# Patient Record
Sex: FEMALE | Race: WHITE | Employment: FULL TIME | URBAN - METROPOLITAN AREA
[De-identification: names, ages, dates, MRNs, and addresses within clinical notes are randomized per-mention and may not be internally consistent; named-entity substitution may affect disease eponyms.]

---

## 2017-03-15 ENCOUNTER — HOSPITAL ENCOUNTER (OUTPATIENT)
Dept: MAMMOGRAPHY | Age: 47
Discharge: HOME | End: 2017-03-15
Attending: OBSTETRICS & GYNECOLOGY
Payer: COMMERCIAL

## 2017-03-15 DIAGNOSIS — Z12.31 VISIT FOR SCREENING MAMMOGRAM: ICD-10-CM

## 2017-03-15 PROCEDURE — 77067 SCR MAMMO BI INCL CAD: CPT

## 2017-03-23 ENCOUNTER — TELEPHONE (OUTPATIENT)
Dept: INTERNAL MEDICINE CLINIC | Facility: CLINIC | Age: 47
End: 2017-03-23

## 2017-03-23 DIAGNOSIS — Z13.0 SCREENING FOR DISORDER OF BLOOD AND BLOOD-FORMING ORGANS: ICD-10-CM

## 2017-03-23 DIAGNOSIS — Z00.00 LABORATORY EXAM ORDERED AS PART OF ROUTINE GENERAL MEDICAL EXAMINATION: Primary | ICD-10-CM

## 2017-03-23 DIAGNOSIS — Z13.220 SCREENING FOR LIPOID DISORDERS: ICD-10-CM

## 2017-03-23 DIAGNOSIS — Z13.228 SCREENING FOR METABOLIC DISORDER: ICD-10-CM

## 2017-04-20 ENCOUNTER — LAB ENCOUNTER (OUTPATIENT)
Dept: LAB | Age: 47
End: 2017-04-20
Attending: INTERNAL MEDICINE
Payer: COMMERCIAL

## 2017-04-20 DIAGNOSIS — Z00.00 LABORATORY EXAM ORDERED AS PART OF ROUTINE GENERAL MEDICAL EXAMINATION: ICD-10-CM

## 2017-04-20 DIAGNOSIS — Z13.220 SCREENING FOR LIPOID DISORDERS: ICD-10-CM

## 2017-04-20 DIAGNOSIS — Z13.0 SCREENING FOR DISORDER OF BLOOD AND BLOOD-FORMING ORGANS: ICD-10-CM

## 2017-04-20 DIAGNOSIS — Z13.228 SCREENING FOR METABOLIC DISORDER: ICD-10-CM

## 2017-04-20 PROCEDURE — 80061 LIPID PANEL: CPT | Performed by: INTERNAL MEDICINE

## 2017-04-20 PROCEDURE — 36415 COLL VENOUS BLD VENIPUNCTURE: CPT | Performed by: INTERNAL MEDICINE

## 2017-04-20 PROCEDURE — 85025 COMPLETE CBC W/AUTO DIFF WBC: CPT | Performed by: INTERNAL MEDICINE

## 2017-04-20 PROCEDURE — 80053 COMPREHEN METABOLIC PANEL: CPT | Performed by: INTERNAL MEDICINE

## 2017-05-10 ENCOUNTER — OFFICE VISIT (OUTPATIENT)
Dept: INTERNAL MEDICINE CLINIC | Facility: CLINIC | Age: 47
End: 2017-05-10

## 2017-05-10 VITALS
HEIGHT: 63 IN | RESPIRATION RATE: 16 BRPM | DIASTOLIC BLOOD PRESSURE: 60 MMHG | WEIGHT: 124 LBS | BODY MASS INDEX: 21.97 KG/M2 | HEART RATE: 60 BPM | SYSTOLIC BLOOD PRESSURE: 102 MMHG | TEMPERATURE: 98 F

## 2017-05-10 DIAGNOSIS — Z00.00 ROUTINE GENERAL MEDICAL EXAMINATION AT A HEALTH CARE FACILITY: Primary | ICD-10-CM

## 2017-05-10 DIAGNOSIS — L98.9 SKIN LESION: ICD-10-CM

## 2017-05-10 PROCEDURE — 99396 PREV VISIT EST AGE 40-64: CPT | Performed by: INTERNAL MEDICINE

## 2017-05-10 NOTE — PROGRESS NOTES
HPI:    Patient ID: Wily Silva is a 55year old female.     HPI  Here for pe, up to date with gyne, no complaints, recent labs done and ok, exercises and feels well  /60 mmHg  Pulse 60  Temp(Src) 98.2 °F (36.8 °C) (Oral)  Resp 16  Ht 63\" to person, place, and time. Skin: Skin is warm. She is not diaphoretic. Keratotic skin lesion, non pigmented behind right ear 1mm   Psychiatric: She has a normal mood and affect.               ASSESSMENT/PLAN:   Routine general medical examination at a

## 2017-12-09 PROBLEM — M72.2 PLANTAR FASCIITIS OF LEFT FOOT: Status: ACTIVE | Noted: 2017-12-09

## 2017-12-09 PROBLEM — S76.319A HAMSTRING STRAIN, UNSPECIFIED LATERALITY, INITIAL ENCOUNTER: Status: ACTIVE | Noted: 2017-12-09

## 2018-03-23 ENCOUNTER — LAB ENCOUNTER (OUTPATIENT)
Dept: LAB | Age: 48
End: 2018-03-23
Attending: NURSE PRACTITIONER
Payer: COMMERCIAL

## 2018-03-23 DIAGNOSIS — Z01.419 PAP SMEAR, LOW-RISK: Primary | ICD-10-CM

## 2018-03-23 PROCEDURE — 88175 CYTOPATH C/V AUTO FLUID REDO: CPT

## 2018-03-23 PROCEDURE — 87624 HPV HI-RISK TYP POOLED RSLT: CPT

## 2018-03-26 LAB — HPV I/H RISK 1 DNA SPEC QL NAA+PROBE: NEGATIVE

## 2018-03-28 ENCOUNTER — HOSPITAL ENCOUNTER (OUTPATIENT)
Dept: MAMMOGRAPHY | Age: 48
Discharge: HOME | End: 2018-03-28
Attending: NURSE PRACTITIONER
Payer: COMMERCIAL

## 2018-03-28 DIAGNOSIS — Z12.31 VISIT FOR SCREENING MAMMOGRAM: ICD-10-CM

## 2018-03-28 PROCEDURE — 77067 SCR MAMMO BI INCL CAD: CPT | Performed by: NURSE PRACTITIONER

## 2018-09-12 ENCOUNTER — OFFICE VISIT (OUTPATIENT)
Dept: INTERNAL MEDICINE CLINIC | Facility: CLINIC | Age: 48
End: 2018-09-12
Payer: COMMERCIAL

## 2018-09-12 VITALS
WEIGHT: 128 LBS | HEART RATE: 66 BPM | SYSTOLIC BLOOD PRESSURE: 124 MMHG | HEIGHT: 63 IN | TEMPERATURE: 98 F | BODY MASS INDEX: 22.68 KG/M2 | DIASTOLIC BLOOD PRESSURE: 70 MMHG | RESPIRATION RATE: 14 BRPM

## 2018-09-12 DIAGNOSIS — M62.830 LUMBAR PARASPINAL MUSCLE SPASM: ICD-10-CM

## 2018-09-12 DIAGNOSIS — M54.16 LUMBAR RADICULOPATHY: ICD-10-CM

## 2018-09-12 DIAGNOSIS — M62.838 CERVICAL PARASPINAL MUSCLE SPASM: Primary | ICD-10-CM

## 2018-09-12 PROCEDURE — 99213 OFFICE O/P EST LOW 20 MIN: CPT | Performed by: INTERNAL MEDICINE

## 2018-09-12 RX ORDER — OMEGA-3 FATTY ACIDS/FISH OIL 300-1000MG
CAPSULE ORAL
COMMUNITY
End: 2020-01-09 | Stop reason: ALTCHOICE

## 2018-09-12 RX ORDER — CYCLOBENZAPRINE HCL 10 MG
10 TABLET ORAL 2 TIMES DAILY PRN
Qty: 28 TABLET | Refills: 0 | Status: SHIPPED | OUTPATIENT
Start: 2018-09-12 | End: 2018-09-26

## 2018-09-12 NOTE — PROGRESS NOTES
Gino Orozco  10/3/1970    Patient presents with:  Neck Pain: AB RM 12, Patient states feeling tingling both knee down, neck injury       SUBJECTIVE   Gino Orozco is a 52year old female who presents with neck and lower back discomfort wit also worsen following exertional physical activity or with certain positions. She denies any recent trauma affecting her lower back. Review of Systems   No f/c/chest pain or sob. No cough. No abd pain/n/v/d. No ha or dizziness.  No other complaints toda Smoking status: Never Smoker      Smokeless tobacco: Never Used    Alcohol use:  Yes      Alcohol/week: 2.0 - 2.5 oz      Types: 4 - 5 Glasses of wine per week      Comment: Cage done 09/12/2018    Drug use: No        OBJECTIVE:   /70   Pulse 66   Tem issues and agrees to the plan. TODAY'S ORDERS     No orders of the defined types were placed in this encounter.       Meds & Refills:  Requested Prescriptions     Signed Prescriptions Disp Refills   • Cyclobenzaprine HCl 10 MG Oral Tab 28 tablet 0     Si

## 2018-09-21 ENCOUNTER — TELEPHONE (OUTPATIENT)
Dept: INTERNAL MEDICINE CLINIC | Facility: CLINIC | Age: 48
End: 2018-09-21

## 2018-09-21 DIAGNOSIS — Z13.228 SCREENING FOR METABOLIC DISORDER: ICD-10-CM

## 2018-09-21 DIAGNOSIS — Z13.0 SCREENING FOR DISORDER OF BLOOD AND BLOOD-FORMING ORGANS: ICD-10-CM

## 2018-09-21 DIAGNOSIS — Z13.220 SCREENING FOR LIPOID DISORDERS: ICD-10-CM

## 2018-09-21 DIAGNOSIS — Z00.00 ROUTINE GENERAL MEDICAL EXAMINATION AT A HEALTH CARE FACILITY: Primary | ICD-10-CM

## 2018-09-21 NOTE — TELEPHONE ENCOUNTER
Pt scheduled for a CPE on 12/10/17 with JL Orders to Eleanor Pacheco must fast no call back required

## 2018-09-24 PROBLEM — K51.20 ULCERATIVE PROCTITIS WITHOUT COMPLICATION (HCC): Status: ACTIVE | Noted: 2018-09-24

## 2018-09-28 ENCOUNTER — PATIENT MESSAGE (OUTPATIENT)
Dept: INTERNAL MEDICINE CLINIC | Facility: CLINIC | Age: 48
End: 2018-09-28

## 2018-10-01 RX ORDER — METHYLPREDNISOLONE 4 MG/1
TABLET ORAL
Qty: 1 KIT | Refills: 0 | Status: SHIPPED | OUTPATIENT
Start: 2018-10-01 | End: 2018-12-10

## 2018-10-01 NOTE — TELEPHONE ENCOUNTER
From: Sky Mederos  To: Deborah Nagy MD  Sent: 9/28/2018 9:22 PM CDT  Subject: Visit Follow-up Question    Hi Dr. Sav Khan been taking the muscle relaxer every night, but I still have the pain on the right side of my neck.  I am still experienc

## 2018-10-18 ENCOUNTER — PATIENT MESSAGE (OUTPATIENT)
Dept: INTERNAL MEDICINE CLINIC | Facility: CLINIC | Age: 48
End: 2018-10-18

## 2018-10-18 DIAGNOSIS — M54.12 CERVICAL RADICULOPATHY: Primary | ICD-10-CM

## 2018-10-19 NOTE — TELEPHONE ENCOUNTER
[id number]  ASSESSMENT AND PLAN:   Sky Mederos is a 52year old female who presents with neck and lower back discomfort with bilateral lower extremity pain and tingling.      Cervical and lumbar paraspinal muscle strain with intermittent radicu

## 2018-10-19 NOTE — TELEPHONE ENCOUNTER
From: Sherryle Sergeant  To: Houston Zhu MD  Sent: 10/18/2018 10:27 PM CDT  Subject: Visit Daniele Ramos,  I finished the Medrol Dosepak, but I'm still having pain on the right side of my neck.  The pain keeps waking me up at night

## 2018-10-26 ENCOUNTER — HOSPITAL ENCOUNTER (OUTPATIENT)
Dept: GENERAL RADIOLOGY | Age: 48
Discharge: HOME | End: 2018-10-26
Attending: INTERNAL MEDICINE
Payer: COMMERCIAL

## 2018-10-26 DIAGNOSIS — M54.12 CERVICAL RADICULOPATHY: ICD-10-CM

## 2018-10-26 PROCEDURE — 72050 X-RAY EXAM NECK SPINE 4/5VWS: CPT | Performed by: INTERNAL MEDICINE

## 2018-12-05 ENCOUNTER — LAB ENCOUNTER (OUTPATIENT)
Dept: LAB | Age: 48
End: 2018-12-05
Attending: INTERNAL MEDICINE
Payer: COMMERCIAL

## 2018-12-05 DIAGNOSIS — Z13.0 SCREENING FOR DISORDER OF BLOOD AND BLOOD-FORMING ORGANS: ICD-10-CM

## 2018-12-05 DIAGNOSIS — Z13.228 SCREENING FOR METABOLIC DISORDER: ICD-10-CM

## 2018-12-05 DIAGNOSIS — Z13.220 SCREENING FOR LIPOID DISORDERS: ICD-10-CM

## 2018-12-05 DIAGNOSIS — Z00.00 ROUTINE GENERAL MEDICAL EXAMINATION AT A HEALTH CARE FACILITY: ICD-10-CM

## 2018-12-05 PROCEDURE — 80061 LIPID PANEL: CPT | Performed by: INTERNAL MEDICINE

## 2018-12-05 PROCEDURE — 85025 COMPLETE CBC W/AUTO DIFF WBC: CPT | Performed by: INTERNAL MEDICINE

## 2018-12-05 PROCEDURE — 80053 COMPREHEN METABOLIC PANEL: CPT | Performed by: INTERNAL MEDICINE

## 2018-12-10 ENCOUNTER — APPOINTMENT (OUTPATIENT)
Dept: LAB | Age: 48
End: 2018-12-10
Attending: INTERNAL MEDICINE
Payer: COMMERCIAL

## 2018-12-10 ENCOUNTER — OFFICE VISIT (OUTPATIENT)
Dept: INTERNAL MEDICINE CLINIC | Facility: CLINIC | Age: 48
End: 2018-12-10
Payer: COMMERCIAL

## 2018-12-10 VITALS
HEIGHT: 63 IN | SYSTOLIC BLOOD PRESSURE: 104 MMHG | TEMPERATURE: 98 F | WEIGHT: 131 LBS | RESPIRATION RATE: 20 BRPM | DIASTOLIC BLOOD PRESSURE: 68 MMHG | BODY MASS INDEX: 23.21 KG/M2 | HEART RATE: 68 BPM

## 2018-12-10 DIAGNOSIS — M79.2 NEURALGIA: ICD-10-CM

## 2018-12-10 DIAGNOSIS — M54.2 NECK PAIN ON RIGHT SIDE: ICD-10-CM

## 2018-12-10 DIAGNOSIS — Z00.00 ROUTINE GENERAL MEDICAL EXAMINATION AT A HEALTH CARE FACILITY: Primary | ICD-10-CM

## 2018-12-10 PROCEDURE — 84443 ASSAY THYROID STIM HORMONE: CPT | Performed by: INTERNAL MEDICINE

## 2018-12-10 PROCEDURE — 99212 OFFICE O/P EST SF 10 MIN: CPT | Performed by: INTERNAL MEDICINE

## 2018-12-10 PROCEDURE — 84439 ASSAY OF FREE THYROXINE: CPT | Performed by: INTERNAL MEDICINE

## 2018-12-10 PROCEDURE — 99396 PREV VISIT EST AGE 40-64: CPT | Performed by: INTERNAL MEDICINE

## 2018-12-10 NOTE — PROGRESS NOTES
HPI:    Patient ID: Florecita Dougherty is a 50year old female.     HPI  Here for pe, feels well, follows with gyne, declines flu shot, right neck pain, better but not resolved after her oct visit, co chronic nightime bilateral hand parasthesias also with wheezes. Abdominal: Soft. There is no tenderness. Musculoskeletal: She exhibits no edema. Neurological: She is oriented to person, place, and time. No cranial nerve deficit. Skin: Skin is warm. No rash noted. She is not diaphoretic.    Psychiatric:

## 2019-01-14 ENCOUNTER — OFFICE VISIT (OUTPATIENT)
Dept: ELECTROPHYSIOLOGY | Facility: HOSPITAL | Age: 49
End: 2019-01-14
Attending: INTERNAL MEDICINE
Payer: COMMERCIAL

## 2019-01-14 DIAGNOSIS — M79.2 NEURALGIA: ICD-10-CM

## 2019-01-14 PROCEDURE — 95911 NRV CNDJ TEST 9-10 STUDIES: CPT | Performed by: OTHER

## 2019-01-14 PROCEDURE — 95886 MUSC TEST DONE W/N TEST COMP: CPT | Performed by: OTHER

## 2019-01-14 NOTE — PROCEDURES
[address] [place] [address]  Lenny, Hollie [facility]  [phone]            Patient: Jenny Partida Sex: Female  Patient ID: [id number] YOB: 1970      Visit Date: 1/14/2019 08:51  Patient Age On Visi Wrist ADM 2.40 ?3.60 12.8 ?5.0 100  100 ?50.00 Wrist - ADM 7        B. Elbow ADM 5.26  10.2  80 ?70 100  B. Elbow - Wrist 16 56 ?49      A. Elbow ADM 7.50  9.8  96.4 ?115 100  A. Elbow - B. Elbow 15 67 ?49   L Ulnar - ADM      Wrist ADM 2.45 ?3.60 9.2 ?5.0 1 The needle study of selected muscles of the RUE (C5-T1 myotomes), the LUE 9C5-T1 myotomes) and the bilateral C6-C7 paraspinal muscles was normal.     Conclusion: This is a normal study.  There is no electrodiagnostic evidence of an UE large fibre neuropat

## 2019-01-16 ENCOUNTER — TELEPHONE (OUTPATIENT)
Dept: INTERNAL MEDICINE CLINIC | Facility: CLINIC | Age: 49
End: 2019-01-16

## 2019-01-16 ENCOUNTER — HOSPITAL ENCOUNTER (OUTPATIENT)
Dept: PHYSICAL THERAPY | Facility: HOSPITAL | Age: 49
Setting detail: THERAPIES SERIES
Discharge: HOME | End: 2019-01-16
Attending: INTERNAL MEDICINE
Payer: COMMERCIAL

## 2019-01-16 PROCEDURE — 97161 PT EVAL LOW COMPLEX 20 MIN: CPT

## 2019-01-16 PROCEDURE — 97110 THERAPEUTIC EXERCISES: CPT

## 2019-01-16 NOTE — PROGRESS NOTES
SPINE EVALUATION:   Referring Physician: Dr. Elijah Falcon  Diagnosis: neck pain     Date of Service: 1/16/2019     PATIENT Shagufta Cagle is a 50year old female who presents to therapy today with complaints of R sided neck pain following a but does have some deep neck flexor and scapular weakness that is contributing to her pain and difficulty with daily activities, including turning her head, lifting, sleeping, and regular exercise.   Nabila Elliott would benefit from skilled Physical Therapy to rosalba order to improve functional mobility. Frequency / Duration: Patient will be seen for 2 x/week or a total of 8 visits over a 90 day period. Treatment will include: Manual Therapy; Therapeutic Exercises; Neuromuscular Re-education;  Therapeutic Activity; P

## 2019-01-16 NOTE — TELEPHONE ENCOUNTER
Patient medical records faxed to [employer]. Faxed 1/16/2019 to [phone]    Faxed below items  Office notes for 9/12/2018 and 12/10/2018  Test results from 9/12/2019 and 12/10/2018  Medication list  Orders for EMG and PT.     Fax confirmatio

## 2019-01-21 ENCOUNTER — HOSPITAL ENCOUNTER (OUTPATIENT)
Dept: PHYSICAL THERAPY | Facility: HOSPITAL | Age: 49
Setting detail: THERAPIES SERIES
Discharge: HOME | End: 2019-01-21
Attending: INTERNAL MEDICINE
Payer: COMMERCIAL

## 2019-01-21 PROCEDURE — 97110 THERAPEUTIC EXERCISES: CPT

## 2019-01-21 PROCEDURE — 97140 MANUAL THERAPY 1/> REGIONS: CPT

## 2019-01-21 NOTE — PROGRESS NOTES
Dx: neck pain           Authorized # of Visits:  8         Next MD visit: none scheduled  Fall Risk: standard         Precautions: n/a             Subjective: Patient reports that her neck felt more stiff for about 2 days following initial evaluation and t Total Treatment Time: 35 min

## 2019-01-23 ENCOUNTER — HOSPITAL ENCOUNTER (OUTPATIENT)
Dept: PHYSICAL THERAPY | Facility: HOSPITAL | Age: 49
Setting detail: THERAPIES SERIES
Discharge: HOME | End: 2019-01-23
Attending: INTERNAL MEDICINE
Payer: COMMERCIAL

## 2019-01-23 PROCEDURE — 97140 MANUAL THERAPY 1/> REGIONS: CPT

## 2019-01-23 PROCEDURE — 97110 THERAPEUTIC EXERCISES: CPT

## 2019-01-23 NOTE — PROGRESS NOTES
Dx: neck pain           Authorized # of Visits:  8         Next MD visit: none scheduled  Fall Risk: standard         Precautions: n/a             Subjective: Patient reports that her neck felt stiff again after previous session but that she was able to do extension isometric, [id number]        Seated dips Cervical rotation isometric, 10x5 sec                                       Charges: Paramjit 1( 10 min) manual x 2 ( 25 min)   Total Timed Treatment: 35 min     Total Treatment Time: 35 min

## 2019-01-28 ENCOUNTER — APPOINTMENT (OUTPATIENT)
Dept: PHYSICAL THERAPY | Facility: HOSPITAL | Age: 49
End: 2019-01-28
Attending: INTERNAL MEDICINE
Payer: COMMERCIAL

## 2019-01-30 ENCOUNTER — APPOINTMENT (OUTPATIENT)
Dept: PHYSICAL THERAPY | Facility: HOSPITAL | Age: 49
End: 2019-01-30
Attending: INTERNAL MEDICINE
Payer: COMMERCIAL

## 2019-02-04 ENCOUNTER — HOSPITAL ENCOUNTER (OUTPATIENT)
Dept: PHYSICAL THERAPY | Facility: HOSPITAL | Age: 49
Setting detail: THERAPIES SERIES
Discharge: HOME | End: 2019-02-04
Attending: INTERNAL MEDICINE
Payer: OTHER MISCELLANEOUS

## 2019-02-04 PROCEDURE — 97140 MANUAL THERAPY 1/> REGIONS: CPT

## 2019-02-04 PROCEDURE — 97110 THERAPEUTIC EXERCISES: CPT

## 2019-02-04 NOTE — PROGRESS NOTES
Dx: neck pain           Authorized # of Visits:  8         Next MD visit: none scheduled  Fall Risk: standard         Precautions: n/a             Subjective: Patient reports that she was feeling very good until yesterday.  After doing pushups and planks wh Cervical paraspinal STM, 5 min       Seated median nerve glide, 20 Seated median nerve glide, 20 Seated median nerve glide, 20       Seated cervical mobilization w/ movement, L rotation at C3-4, 20 Cervical extension isometric, [id number] Seated cervical rota

## 2019-02-06 ENCOUNTER — APPOINTMENT (OUTPATIENT)
Dept: PHYSICAL THERAPY | Facility: HOSPITAL | Age: 49
End: 2019-02-06
Attending: INTERNAL MEDICINE
Payer: OTHER MISCELLANEOUS

## 2019-02-11 ENCOUNTER — HOSPITAL ENCOUNTER (OUTPATIENT)
Dept: PHYSICAL THERAPY | Facility: HOSPITAL | Age: 49
Setting detail: THERAPIES SERIES
Discharge: HOME | End: 2019-02-11
Attending: INTERNAL MEDICINE
Payer: OTHER MISCELLANEOUS

## 2019-02-11 PROCEDURE — 97110 THERAPEUTIC EXERCISES: CPT

## 2019-02-11 PROCEDURE — 97140 MANUAL THERAPY 1/> REGIONS: CPT

## 2019-02-11 NOTE — PROGRESS NOTES
Dx: neck pain           Authorized # of Visits:  8         Next MD visit: none scheduled  Fall Risk: standard         Precautions: n/a             Subjective: Patient felt good until Thursday when she went back to \"plyo\" class.  She did not have pain duri STM, 5 min Cervical paraspinal STM, 5 min downslope mobilizlations, 5 min      Seated median nerve glide, 20 Seated median nerve glide, 20 Seated median nerve glide, 20 Seated extension w/ pillowcase, 15      Seated cervical mobilization w/ movement, L rot

## 2019-02-13 ENCOUNTER — HOSPITAL ENCOUNTER (OUTPATIENT)
Dept: PHYSICAL THERAPY | Facility: HOSPITAL | Age: 49
Setting detail: THERAPIES SERIES
Discharge: HOME | End: 2019-02-13
Attending: INTERNAL MEDICINE
Payer: OTHER MISCELLANEOUS

## 2019-02-13 PROCEDURE — 97110 THERAPEUTIC EXERCISES: CPT

## 2019-02-13 PROCEDURE — 97140 MANUAL THERAPY 1/> REGIONS: CPT

## 2019-02-13 NOTE — PROGRESS NOTES
Dx: neck pain           Authorized # of Visits:  8         Next MD visit: none scheduled  Fall Risk: standard         Precautions: n/a             Subjective: Patient reports her neck is feeling better.  She has noticed that her neck has felt less tight and 5 min Upslope mobilization C3-4, 5 min Supine lateral glide L>R, 5 min Supine lateral glide R>L, 5 min     Supine median nerve glide, 5 min Cervical paraspinal STM, 5 min Cervical paraspinal STM, 5 min downslope mobilizlations, 5 min downslope mobilizlatio

## 2019-02-20 ENCOUNTER — HOSPITAL ENCOUNTER (OUTPATIENT)
Dept: PHYSICAL THERAPY | Facility: HOSPITAL | Age: 49
Setting detail: THERAPIES SERIES
Discharge: HOME | End: 2019-02-20
Attending: INTERNAL MEDICINE
Payer: OTHER MISCELLANEOUS

## 2019-02-20 PROCEDURE — 97140 MANUAL THERAPY 1/> REGIONS: CPT

## 2019-02-20 PROCEDURE — 97110 THERAPEUTIC EXERCISES: CPT

## 2019-02-20 NOTE — PROGRESS NOTES
Dx: neck pain           Authorized # of Visits:  8         Next MD visit: none scheduled  Fall Risk: standard         Precautions: n/a             Subjective: Patient felt really good after previous session and she was able to go to exercise class over the Supine PPIVM L rotation at C3-4, 5 min Upslope mobilization C3-4, 5 min Upslope mobilization C3-4, 5 min Supine lateral glide L>R, 5 min Supine lateral glide R>L, 5 min supine isometric cervical rotation, [id number] each    Supine median nerve glide, 5 min

## 2019-02-27 ENCOUNTER — HOSPITAL ENCOUNTER (OUTPATIENT)
Dept: PHYSICAL THERAPY | Facility: HOSPITAL | Age: 49
Setting detail: THERAPIES SERIES
Discharge: HOME | End: 2019-02-27
Attending: INTERNAL MEDICINE
Payer: OTHER MISCELLANEOUS

## 2019-02-27 PROCEDURE — 97140 MANUAL THERAPY 1/> REGIONS: CPT

## 2019-02-27 PROCEDURE — 97110 THERAPEUTIC EXERCISES: CPT

## 2019-02-27 NOTE — PROGRESS NOTES
Dx: neck pain           Authorized # of Visits:  8         Next MD visit: none scheduled  Fall Risk: standard         Precautions: n/a             Subjective: Patient has not had pain during the day since previous session and has been able to go to exercis III+ in extension, 5 min Prone mid trap, 4#, 2x10   Same with slight R rotation, 5 min Cervical lateral glide, C3-4 R>L, gr III Prone C4-6 R UPA, gr III+, 5 min Prone C4-6 R UPA, gr III+, 5 min Prone C4-6 R UPA, gr IV, 10 min S/l C3-4 R UPA, gr IV+ Prone l

## 2019-03-06 ENCOUNTER — HOSPITAL ENCOUNTER (OUTPATIENT)
Dept: PHYSICAL THERAPY | Facility: HOSPITAL | Age: 49
Setting detail: THERAPIES SERIES
Discharge: HOME | End: 2019-03-06
Attending: INTERNAL MEDICINE
Payer: OTHER MISCELLANEOUS

## 2019-03-06 PROCEDURE — 97110 THERAPEUTIC EXERCISES: CPT

## 2019-03-06 PROCEDURE — 97140 MANUAL THERAPY 1/> REGIONS: CPT

## 2019-03-06 NOTE — PROGRESS NOTES
Dx: neck pain           Authorized # of Visits:  8         Next MD visit: none scheduled  Fall Risk: standard         Precautions: n/a             Subjective: Patient reports that her neck still feels overall better but she does still have stiffness upon w C4-6 R UPA, gr IV, 10 min S/l C3-4 R UPA, gr IV+ Prone low trap, 3#, 2x10 Supine side glide C3-4 L>R, 5 min      Supine PPIVM L rotation at C3-4, 5 min Upslope mobilization C3-4, 5 min Upslope mobilization C3-4, 5 min Supine lateral glide L>R, 5 min Supine

## 2019-03-20 ENCOUNTER — HOSPITAL ENCOUNTER (OUTPATIENT)
Dept: PHYSICAL THERAPY | Facility: HOSPITAL | Age: 49
Setting detail: THERAPIES SERIES
Discharge: HOME | End: 2019-03-20
Attending: INTERNAL MEDICINE
Payer: OTHER MISCELLANEOUS

## 2019-03-20 PROCEDURE — 97110 THERAPEUTIC EXERCISES: CPT

## 2019-03-20 PROCEDURE — 97140 MANUAL THERAPY 1/> REGIONS: CPT

## 2019-03-20 NOTE — PROGRESS NOTES
Dx: neck pain           Authorized # of Visits:  8         Next MD visit: none scheduled  Fall Risk: standard         Precautions: n/a             Subjective: Patient reports that her neck has felt better while away on vacation.  She slept in hotel rooms an slight R rotation, 5 min Cervical lateral glide, C3-4 R>L, gr III Prone C4-6 R UPA, gr III+, 5 min Prone C4-6 R UPA, gr III+, 5 min Prone C4-6 R UPA, gr IV, 10 min S/l C3-4 R UPA, gr IV+ Prone low trap, 3#, 2x10 Supine side glide C3-4 L>R, 5 min Supine evette

## 2019-03-26 PROBLEM — Z92.89 HISTORY OF ENDOMETRIAL BIOPSY: Status: ACTIVE | Noted: 2019-03-26

## 2019-03-26 PROBLEM — Z30.430 ENCOUNTER FOR IUD INSERTION: Status: ACTIVE | Noted: 2019-03-26

## 2019-04-01 ENCOUNTER — HOSPITAL ENCOUNTER (OUTPATIENT)
Dept: PHYSICAL THERAPY | Facility: HOSPITAL | Age: 49
Setting detail: THERAPIES SERIES
Discharge: HOME | End: 2019-04-01
Attending: INTERNAL MEDICINE
Payer: OTHER MISCELLANEOUS

## 2019-04-01 PROCEDURE — 97140 MANUAL THERAPY 1/> REGIONS: CPT

## 2019-04-01 PROCEDURE — 97110 THERAPEUTIC EXERCISES: CPT

## 2019-04-01 NOTE — PROGRESS NOTES
Dx: neck pain           Authorized # of Visits:  8         Next MD visit: none scheduled  Fall Risk: standard         Precautions: n/a             Subjective: Patient reports that she has gone back to exercising at 100%.  She has been doing handstands and h slight R rotation, 5 min Cervical lateral glide, C3-4 R>L, gr III Prone C4-6 R UPA, gr III+, 5 min Prone C4-6 R UPA, gr III+, 5 min Prone C4-6 R UPA, gr IV, 10 min S/l C3-4 R UPA, gr IV+ Prone low trap, 3#, 2x10 Supine side glide C3-4 L>R, 5 min Supine evette Prone self mobilization in R rotation                                  Charges: Paramjit 1( 15 min) manual x 2 ( 25 min)   Total Timed Treatment: 40 min     Total Treatment Time: 40 min

## 2019-04-03 ENCOUNTER — HOSPITAL ENCOUNTER (OUTPATIENT)
Dept: MAMMOGRAPHY | Age: 49
Discharge: HOME | End: 2019-04-03
Attending: NURSE PRACTITIONER
Payer: COMMERCIAL

## 2019-04-03 ENCOUNTER — APPOINTMENT (OUTPATIENT)
Dept: PHYSICAL THERAPY | Facility: HOSPITAL | Age: 49
End: 2019-04-03
Attending: INTERNAL MEDICINE
Payer: OTHER MISCELLANEOUS

## 2019-04-03 DIAGNOSIS — Z12.31 BREAST CANCER SCREENING BY MAMMOGRAM: ICD-10-CM

## 2019-04-03 PROCEDURE — 77063 BREAST TOMOSYNTHESIS BI: CPT | Performed by: NURSE PRACTITIONER

## 2019-04-03 PROCEDURE — 77067 SCR MAMMO BI INCL CAD: CPT | Performed by: NURSE PRACTITIONER

## 2019-06-13 ENCOUNTER — HOSPITAL ENCOUNTER (OUTPATIENT)
Dept: GENERAL RADIOLOGY | Age: 49
Discharge: HOME | End: 2019-06-13
Attending: PODIATRIST
Payer: COMMERCIAL

## 2019-06-13 ENCOUNTER — OFFICE VISIT (OUTPATIENT)
Dept: PODIATRY CLINIC | Facility: CLINIC | Age: 49
End: 2019-06-13
Payer: COMMERCIAL

## 2019-06-13 VITALS — HEART RATE: 67 BPM | DIASTOLIC BLOOD PRESSURE: 74 MMHG | RESPIRATION RATE: 18 BRPM | SYSTOLIC BLOOD PRESSURE: 116 MMHG

## 2019-06-13 DIAGNOSIS — M20.5X2 ACQUIRED HALLUX LIMITUS OF BOTH FEET: ICD-10-CM

## 2019-06-13 DIAGNOSIS — M20.5X1 ACQUIRED HALLUX LIMITUS OF BOTH FEET: Primary | ICD-10-CM

## 2019-06-13 DIAGNOSIS — M77.50 CAPSULITIS OF METATARSOPHALANGEAL (MTP) JOINT: ICD-10-CM

## 2019-06-13 DIAGNOSIS — M20.5X1 ACQUIRED HALLUX LIMITUS OF BOTH FEET: ICD-10-CM

## 2019-06-13 DIAGNOSIS — M20.5X2 ACQUIRED HALLUX LIMITUS OF BOTH FEET: Primary | ICD-10-CM

## 2019-06-13 PROCEDURE — 99203 OFFICE O/P NEW LOW 30 MIN: CPT | Performed by: PODIATRIST

## 2019-06-13 PROCEDURE — 20600 DRAIN/INJ JOINT/BURSA W/O US: CPT | Performed by: PODIATRIST

## 2019-06-13 PROCEDURE — 73620 X-RAY EXAM OF FOOT: CPT | Performed by: PODIATRIST

## 2019-06-13 NOTE — PROGRESS NOTES
Per [facility] request was draw 1 syringe with 0.5 ml kenalog 40 mg and 0.5 ml Marcaine 0.5% for this patient left foot. Jacque John

## 2019-06-16 RX ORDER — TRIAMCINOLONE ACETONIDE 40 MG/ML
20 INJECTION, SUSPENSION INTRA-ARTICULAR; INTRAMUSCULAR ONCE
Status: DISCONTINUED | OUTPATIENT
Start: 2019-06-16 | End: 2020-05-22 | Stop reason: WASHOUT

## 2019-06-17 NOTE — PROGRESS NOTES
Heidi Roman is a 50year old female. Patient presents with:   Foot Pain: Left - on set about 3 weeks ago - has pain by the lateral aspect of the big toe rated as 6/10 on and off         HPI:   This very pleasant 55-year-old female patient presents Not on file      Number of children: Not on file      Years of education: Not on file      Highest education level: Not on file    Tobacco Use      Smoking status: Never Smoker      Smokeless tobacco: Never Used    Substance and Sexual Activity      Alcoho left than the right. ASSESSMENT AND PLAN:   Diagnoses and all orders for this visit:    Acquired hallux limitus of both feet  -     XR FOOT WEIGHTBEARING (2 VIEWS), LEFT   (CPT=73620);  Future  -     XR FOOT WEIGHTBEARING (2 VIEWS), RIGHT   (CPT=73620);

## 2019-07-16 ENCOUNTER — OFFICE VISIT (OUTPATIENT)
Dept: PODIATRY CLINIC | Facility: CLINIC | Age: 49
End: 2019-07-16
Payer: COMMERCIAL

## 2019-07-16 DIAGNOSIS — M77.50 CAPSULITIS OF METATARSOPHALANGEAL (MTP) JOINT: ICD-10-CM

## 2019-07-16 DIAGNOSIS — M20.5X2 ACQUIRED HALLUX LIMITUS OF BOTH FEET: Primary | ICD-10-CM

## 2019-07-16 DIAGNOSIS — M20.5X1 ACQUIRED HALLUX LIMITUS OF BOTH FEET: Primary | ICD-10-CM

## 2019-07-16 PROCEDURE — 99213 OFFICE O/P EST LOW 20 MIN: CPT | Performed by: PODIATRIST

## 2019-07-16 NOTE — PROGRESS NOTES
Ariadne Cannon is a 50year old female. Patient presents with: Foot Pain: Left f/u - had cortisone inj on 6/13/19 - patient states she still has some pain 4/10, today 0/10. denies any numbness, tingling and/or pain.         HPI:   Patient returns to education level: Not on file    Tobacco Use      Smoking status: Never Smoker      Smokeless tobacco: Never Used    Substance and Sexual Activity      Alcohol use: Yes        Comment: 4x/month, 1-2 drinks Cage done 09/12/2018      Drug use: No      Sexual contact us if she wishes to do surgery. The patient indicates understanding of these issues and agrees to the plan. Return if symptoms worsen or fail to improve.     True Lesch, DPM  7/16/2019

## 2019-10-07 ENCOUNTER — TELEPHONE (OUTPATIENT)
Dept: INTERNAL MEDICINE CLINIC | Facility: CLINIC | Age: 49
End: 2019-10-07

## 2019-10-07 DIAGNOSIS — Z13.29 SCREENING FOR ENDOCRINE, METABOLIC AND IMMUNITY DISORDER: ICD-10-CM

## 2019-10-07 DIAGNOSIS — Z00.00 ROUTINE GENERAL MEDICAL EXAMINATION AT A HEALTH CARE FACILITY: Primary | ICD-10-CM

## 2019-10-07 DIAGNOSIS — Z13.228 SCREENING FOR ENDOCRINE, METABOLIC AND IMMUNITY DISORDER: ICD-10-CM

## 2019-10-07 DIAGNOSIS — Z13.220 SCREENING FOR LIPID DISORDERS: ICD-10-CM

## 2019-10-07 DIAGNOSIS — Z13.29 SCREENING FOR THYROID DISORDER: ICD-10-CM

## 2019-10-07 DIAGNOSIS — Z13.0 SCREENING FOR DISORDER OF BLOOD AND BLOOD-FORMING ORGANS: ICD-10-CM

## 2019-10-07 DIAGNOSIS — Z13.0 SCREENING FOR ENDOCRINE, METABOLIC AND IMMUNITY DISORDER: ICD-10-CM

## 2019-10-07 NOTE — TELEPHONE ENCOUNTER
Future Appointments   Date Time Provider Ramsey Martin   12/16/2019 11:00 AM Yosvany Anthony MD [facility] [address] [facility]     Orders to edward- Pt aware to fast-no call back required

## 2019-12-09 ENCOUNTER — LAB ENCOUNTER (OUTPATIENT)
Dept: LAB | Age: 49
End: 2019-12-09
Attending: INTERNAL MEDICINE
Payer: COMMERCIAL

## 2019-12-09 DIAGNOSIS — Z13.29 SCREENING FOR ENDOCRINE, METABOLIC AND IMMUNITY DISORDER: ICD-10-CM

## 2019-12-09 DIAGNOSIS — Z13.0 SCREENING FOR DISORDER OF BLOOD AND BLOOD-FORMING ORGANS: ICD-10-CM

## 2019-12-09 DIAGNOSIS — Z13.220 SCREENING FOR LIPID DISORDERS: ICD-10-CM

## 2019-12-09 DIAGNOSIS — Z13.0 SCREENING FOR ENDOCRINE, METABOLIC AND IMMUNITY DISORDER: ICD-10-CM

## 2019-12-09 DIAGNOSIS — Z13.228 SCREENING FOR ENDOCRINE, METABOLIC AND IMMUNITY DISORDER: ICD-10-CM

## 2019-12-09 DIAGNOSIS — Z00.00 ROUTINE GENERAL MEDICAL EXAMINATION AT A HEALTH CARE FACILITY: ICD-10-CM

## 2019-12-09 PROCEDURE — 80061 LIPID PANEL: CPT | Performed by: INTERNAL MEDICINE

## 2019-12-09 PROCEDURE — 85025 COMPLETE CBC W/AUTO DIFF WBC: CPT | Performed by: INTERNAL MEDICINE

## 2019-12-09 PROCEDURE — 80053 COMPREHEN METABOLIC PANEL: CPT | Performed by: INTERNAL MEDICINE

## 2019-12-16 ENCOUNTER — OFFICE VISIT (OUTPATIENT)
Dept: INTERNAL MEDICINE CLINIC | Facility: CLINIC | Age: 49
End: 2019-12-16
Payer: COMMERCIAL

## 2019-12-16 VITALS
HEART RATE: 72 BPM | SYSTOLIC BLOOD PRESSURE: 110 MMHG | TEMPERATURE: 98 F | HEIGHT: 63 IN | BODY MASS INDEX: 22.5 KG/M2 | WEIGHT: 127 LBS | DIASTOLIC BLOOD PRESSURE: 66 MMHG | RESPIRATION RATE: 16 BRPM

## 2019-12-16 DIAGNOSIS — K51.20 ULCERATIVE PROCTITIS WITHOUT COMPLICATION (HCC): Primary | ICD-10-CM

## 2019-12-16 DIAGNOSIS — M21.619 BUNION: ICD-10-CM

## 2019-12-16 DIAGNOSIS — Z00.00 ROUTINE GENERAL MEDICAL EXAMINATION AT A HEALTH CARE FACILITY: ICD-10-CM

## 2019-12-16 PROCEDURE — 99396 PREV VISIT EST AGE 40-64: CPT | Performed by: INTERNAL MEDICINE

## 2019-12-19 NOTE — PROGRESS NOTES
HPI:    Patient ID: Glynn Daley is a 52year old female.     HPI  Here for pe, co bunion left foot, follows with gyne, no other acute complaints, feels well except foot pain  /66 (BP Location: Right arm, Patient Position: Sitting, Cuff Size: Abdominal: Soft. There is no tenderness. Musculoskeletal:         General: No edema. Neurological: She is oriented to person, place, and time. No cranial nerve deficit. Skin: Skin is warm. She is not diaphoretic.    Psychiatric: She has a normal moo

## 2019-12-30 ENCOUNTER — TELEPHONE (OUTPATIENT)
Dept: PODIATRY CLINIC | Facility: CLINIC | Age: 49
End: 2019-12-30

## 2019-12-31 NOTE — TELEPHONE ENCOUNTER
Spoke to pt and scheduled appt with Venkat for 01/09/20 at [facility] at 11:15AM. Pt verbalized understanding.

## 2020-01-09 ENCOUNTER — TELEPHONE (OUTPATIENT)
Dept: PODIATRY CLINIC | Facility: CLINIC | Age: 50
End: 2020-01-09

## 2020-01-09 ENCOUNTER — OFFICE VISIT (OUTPATIENT)
Dept: PODIATRY CLINIC | Facility: CLINIC | Age: 50
End: 2020-01-09
Payer: COMMERCIAL

## 2020-01-09 DIAGNOSIS — M77.50 CAPSULITIS OF METATARSOPHALANGEAL (MTP) JOINT: ICD-10-CM

## 2020-01-09 DIAGNOSIS — M20.5X1 ACQUIRED HALLUX LIMITUS OF BOTH FEET: Primary | ICD-10-CM

## 2020-01-09 DIAGNOSIS — M20.5X2 ACQUIRED HALLUX LIMITUS OF BOTH FEET: Primary | ICD-10-CM

## 2020-01-09 PROCEDURE — 99213 OFFICE O/P EST LOW 20 MIN: CPT | Performed by: PODIATRIST

## 2020-01-09 NOTE — PROGRESS NOTES
Benigno Hillman is a 52year old female. Patient presents with: Foot Pain: F/u left foot pain that she thinks is getting worse. Rec'd cortisone injection on 6/13/19. Pain started coming back around Thanksgiving.   Would like to discuss possible teresa tobacco: Never Used    Substance and Sexual Activity      Alcohol use: Yes        Comment: 4x/month, 1-2 drinks Cage done 09/12/2018      Drug use: No      Sexual activity: Yes        Partners: Male        Birth control/protection: I.U.D.         Comment: L restricted activity possible nonweightbearing was also reviewed.   The possible complications and risks associated with the surgery including but not limited to recurrence of the deformity, nonresolution of the problem, infection as well as hospitalization

## 2020-01-09 NOTE — TELEPHONE ENCOUNTER
Procedure: Cheilectomy first metatarsal left foot  CPT code: 94352  Length of Surgery: 1 hour  Any Instruments: Mini power, mini fluoroscopy  Call patient: ASAP  Anesthesia: MAC  Location: [facility]  Assistance: none  Pacemaker: No  Anticoagulants: No  Nickel A

## 2020-01-10 ENCOUNTER — TELEPHONE (OUTPATIENT)
Dept: PODIATRY CLINIC | Facility: CLINIC | Age: 50
End: 2020-01-10

## 2020-01-10 NOTE — TELEPHONE ENCOUNTER
Patient contacted this date and scheduled for surgery at [place] for 1-22-20 for Cheilectomy of the first metatarsal, left foot. Surgical packet mailed out this date.

## 2020-01-17 ENCOUNTER — TELEPHONE (OUTPATIENT)
Dept: ADMINISTRATIVE | Age: 50
End: 2020-01-17

## 2020-01-17 NOTE — TELEPHONE ENCOUNTER
Called [facility] this date to see if prior Willye Gabinoia is required for the surgery. Spoke with [facility]. No prior auth required. Call CXB#[phone].

## 2020-01-23 ENCOUNTER — TELEPHONE (OUTPATIENT)
Dept: PODIATRY CLINIC | Facility: CLINIC | Age: 50
End: 2020-01-23

## 2020-01-23 NOTE — TELEPHONE ENCOUNTER
Contacted patient for post op update. Verified name and . Patient had left foot surgery 2020. Patient states she has \"aching \" all over her foot.    Patient states the pain is like a \"throbbing sensation\"  Pain scale when lying down is 4/1

## 2020-01-24 NOTE — TELEPHONE ENCOUNTER
Dr. Ivon Ferreira,      Please sign off on form:  -Highlight the patient and hit \"Chart\" button. -In Chart Review, w/in the Encounter tab - click 1 time on the Telephone call encounter for 1/17/20.  Scroll down the telephone encounter.  -Click \"scan on\" blue

## 2020-02-01 ENCOUNTER — OFFICE VISIT (OUTPATIENT)
Dept: PODIATRY CLINIC | Facility: CLINIC | Age: 50
End: 2020-02-01
Payer: COMMERCIAL

## 2020-02-01 DIAGNOSIS — Z98.890 POST-OPERATIVE STATE: Primary | ICD-10-CM

## 2020-02-01 PROCEDURE — 99024 POSTOP FOLLOW-UP VISIT: CPT | Performed by: PODIATRIST

## 2020-02-03 NOTE — PROGRESS NOTES
Maria Elena Terrell is a 52year old female. Patient presents with:  Post-Op: sx 1/22/20, left foot. pt office wearing post op shoe. she says this is her first day out. there is pain/some throbbing when putting foot down, relieved by elevation/icing.  pt name: Not on file      Number of children: Not on file      Years of education: Not on file      Highest education level: Not on file    Tobacco Use      Smoking status: Never Smoker      Smokeless tobacco: Never Used    Substance and Sexual Activity

## 2020-02-07 ENCOUNTER — OFFICE VISIT (OUTPATIENT)
Dept: PODIATRY CLINIC | Facility: CLINIC | Age: 50
End: 2020-02-07
Payer: COMMERCIAL

## 2020-02-07 DIAGNOSIS — Z98.890 POST-OPERATIVE STATE: Primary | ICD-10-CM

## 2020-02-07 PROCEDURE — 99024 POSTOP FOLLOW-UP VISIT: CPT | Performed by: PODIATRIST

## 2020-02-10 NOTE — PROGRESS NOTES
Karon Chowdhury is a 52year old female. Patient presents with:  Post-Op: left foot sx 1/22/20 - 2nd POV - pain wakes up patient at night - denies taking any pain medication - pain scale 4/10.         HPI:   Patient returns to clinic now 2 weeks statu Yes        Comment: 4x/month, 1-2 drinks Cage done 09/12/2018      Drug use: No      Sexual activity: Yes        Partners: Male        Birth control/protection: I.U.D. Comment: Justine iud    Other Topics      Concerns:        Caffeine Concern:  Yes

## 2020-02-11 ENCOUNTER — MED REC SCAN ONLY (OUTPATIENT)
Dept: INTERNAL MEDICINE CLINIC | Facility: CLINIC | Age: 50
End: 2020-02-11

## 2020-02-14 ENCOUNTER — OFFICE VISIT (OUTPATIENT)
Dept: PODIATRY CLINIC | Facility: CLINIC | Age: 50
End: 2020-02-14
Payer: COMMERCIAL

## 2020-02-14 DIAGNOSIS — M20.5X2 ACQUIRED HALLUX LIMITUS OF BOTH FEET: ICD-10-CM

## 2020-02-14 DIAGNOSIS — M20.5X1 ACQUIRED HALLUX LIMITUS OF BOTH FEET: ICD-10-CM

## 2020-02-14 DIAGNOSIS — M77.50 CAPSULITIS OF METATARSOPHALANGEAL (MTP) JOINT: ICD-10-CM

## 2020-02-14 DIAGNOSIS — Z98.890 POST-OPERATIVE STATE: Primary | ICD-10-CM

## 2020-02-14 PROCEDURE — 99024 POSTOP FOLLOW-UP VISIT: CPT | Performed by: PODIATRIST

## 2020-02-14 NOTE — PROGRESS NOTES
Gayle Boyd is a 52year old female. Patient presents with:  Post-Op: s/p Cheilectomy of the first metatarsal, left foot -- Sx on 01/22/20. Rates pain 3/10. Denies any fever or calf pain.          HPI:   Patient is now 3 weeks status post surgery 4x/month, 1-2 drinks Cage done 09/12/2018      Drug use: No      Sexual activity: Yes        Partners: Male        Birth control/protection: I.U.D.         Comment: Justine iud    Other Topics      Concerns:        Caffeine Concern: Yes        Exercise: Yes

## 2020-02-19 ENCOUNTER — TELEPHONE (OUTPATIENT)
Dept: PODIATRY CLINIC | Facility: CLINIC | Age: 50
End: 2020-02-19

## 2020-02-19 ENCOUNTER — OFFICE VISIT (OUTPATIENT)
Dept: PODIATRY CLINIC | Facility: CLINIC | Age: 50
End: 2020-02-19
Payer: COMMERCIAL

## 2020-02-19 DIAGNOSIS — M77.50 CAPSULITIS OF METATARSOPHALANGEAL (MTP) JOINT: ICD-10-CM

## 2020-02-19 DIAGNOSIS — Z98.890 STATUS POST FOOT SURGERY: Primary | ICD-10-CM

## 2020-02-19 PROCEDURE — 99024 POSTOP FOLLOW-UP VISIT: CPT | Performed by: PODIATRIST

## 2020-02-19 NOTE — PROGRESS NOTES
Dakota West is a 52year old female. Patient presents with:  Post-Op: LOV 2/14/20. sx 1/22/20, cheilectomy of the first metatarsal, left foot. pt tried to go back to work yesterday.  after working one shift her foot was very swollen and painful/th Ovarian/Colon Ca      Social History    Socioeconomic History      Marital status:       Spouse name: Not on file      Number of children: Not on file      Years of education: Not on file      Highest education level: Not on file    Tobacco Use

## 2020-02-19 NOTE — TELEPHONE ENCOUNTER
Pt returned to work after surgery stated yesterday was first day back and she couldn't finish her shift out because her foot began to swell and throbbing feeling what should she do please advise

## 2020-02-19 NOTE — TELEPHONE ENCOUNTER
Spoke to pt and she states that yesterday was her first day returning back to work after surgery. 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

## 2020-02-19 NOTE — TELEPHONE ENCOUNTER
Spoke to pt and informed her of Venkat's message and instruction. Scheduled appt with Venkat for today in [facility] at 2:30pm. Address and directions discussed.

## 2020-02-21 ENCOUNTER — OFFICE VISIT (OUTPATIENT)
Dept: PHYSICAL THERAPY | Facility: HOSPITAL | Age: 50
End: 2020-02-21
Attending: PODIATRIST
Payer: COMMERCIAL

## 2020-02-21 DIAGNOSIS — M77.50 CAPSULITIS OF METATARSOPHALANGEAL (MTP) JOINT: ICD-10-CM

## 2020-02-21 DIAGNOSIS — Z98.890 STATUS POST FOOT SURGERY: ICD-10-CM

## 2020-02-21 PROCEDURE — 97161 PT EVAL LOW COMPLEX 20 MIN: CPT

## 2020-02-21 PROCEDURE — 97140 MANUAL THERAPY 1/> REGIONS: CPT

## 2020-02-21 PROCEDURE — 97110 THERAPEUTIC EXERCISES: CPT

## 2020-02-21 NOTE — PROGRESS NOTES
LOWER EXTREMITY EVALUATION:   Referring Physician: Dr. Ami Hsieh  Diagnosis: s/p foot surgery, capsulitis of L 1st MTP joint     Date of Service: 2/21/2020     PATIENT SUMMARY   Simona Lo is a 52year old female who presents to therapy today w impairments, limited foot and ankle strength, and limited passive accessory joint mobility. Functional deficits include but are not limited to walking, stairs, wearing heels, running.   Signs and symptoms are consistent with diagnosis of 1st MTP capsulitis a HEP for: ankle and foot mobility and strength    Charges: PT Eval Low Complexity, therex x 1, manual x 1      Total Timed Treatment: 30 min     Total Treatment Time: 45 min     PLAN OF CARE:    Goals: (to be met in 8 visits)  Patient will tolerate standi

## 2020-02-26 ENCOUNTER — APPOINTMENT (OUTPATIENT)
Dept: PHYSICAL THERAPY | Facility: HOSPITAL | Age: 50
End: 2020-02-26
Attending: PODIATRIST
Payer: COMMERCIAL

## 2020-03-04 ENCOUNTER — OFFICE VISIT (OUTPATIENT)
Dept: PHYSICAL THERAPY | Facility: HOSPITAL | Age: 50
End: 2020-03-04
Attending: PODIATRIST
Payer: COMMERCIAL

## 2020-03-04 PROCEDURE — 97110 THERAPEUTIC EXERCISES: CPT

## 2020-03-04 PROCEDURE — 97140 MANUAL THERAPY 1/> REGIONS: CPT

## 2020-03-04 NOTE — PROGRESS NOTES
Dx: s/p foot surgery, capsulitis of L 1st MTP joint             Authorized # of Visits:  8 per POC         Next MD visit: none scheduled  Fall Risk: standard         Precautions: n/a             Subjective: Patient reports that her foot is less swollen, sh gastroc stretch, 30 sec    Soleus stretch, 30 sec         1st MTP dorsal and plantar glide, 10 min         1st MTP PROM, 15 min         Towel curl, 2 min         Seated toe abduction, 1 min         Self 1st MTP ROM

## 2020-03-09 ENCOUNTER — OFFICE VISIT (OUTPATIENT)
Dept: PHYSICAL THERAPY | Facility: HOSPITAL | Age: 50
End: 2020-03-09
Attending: PODIATRIST
Payer: COMMERCIAL

## 2020-03-09 DIAGNOSIS — M77.50 CAPSULITIS OF METATARSOPHALANGEAL (MTP) JOINT: ICD-10-CM

## 2020-03-09 DIAGNOSIS — Z98.890 STATUS POST FOOT SURGERY: ICD-10-CM

## 2020-03-09 PROCEDURE — 97110 THERAPEUTIC EXERCISES: CPT

## 2020-03-09 PROCEDURE — 97140 MANUAL THERAPY 1/> REGIONS: CPT

## 2020-03-09 NOTE — PROGRESS NOTES
Dx: s/p foot surgery, capsulitis of L 1st MTP joint             Authorized # of Visits:  8 per POC         Next MD visit: none scheduled  Fall Risk: standard         Precautions: n/a             Subjective: Patient had some increase in soreness over the we MTP PROM, 15 min 1st MTP PROM, 15 min        Towel curl, 2 min Towel curl, 2 min        Seated toe abduction, 1 min Seated toe abduction, 1 min        Self 1st MTP ROM Yellow TB 1st toe extension, 2x10         Stair training, 3 min

## 2020-03-11 ENCOUNTER — OFFICE VISIT (OUTPATIENT)
Dept: PHYSICAL THERAPY | Facility: HOSPITAL | Age: 50
End: 2020-03-11
Attending: PODIATRIST
Payer: COMMERCIAL

## 2020-03-11 DIAGNOSIS — M77.50 CAPSULITIS OF METATARSOPHALANGEAL (MTP) JOINT: ICD-10-CM

## 2020-03-11 DIAGNOSIS — Z98.890 STATUS POST FOOT SURGERY: ICD-10-CM

## 2020-03-11 PROCEDURE — 97140 MANUAL THERAPY 1/> REGIONS: CPT

## 2020-03-11 PROCEDURE — 97110 THERAPEUTIC EXERCISES: CPT

## 2020-03-13 ENCOUNTER — OFFICE VISIT (OUTPATIENT)
Dept: PODIATRY CLINIC | Facility: CLINIC | Age: 50
End: 2020-03-13
Payer: COMMERCIAL

## 2020-03-13 DIAGNOSIS — M77.50 CAPSULITIS OF METATARSOPHALANGEAL (MTP) JOINT: ICD-10-CM

## 2020-03-13 DIAGNOSIS — M20.5X1 ACQUIRED HALLUX LIMITUS OF BOTH FEET: ICD-10-CM

## 2020-03-13 DIAGNOSIS — Z98.890 POST-OPERATIVE STATE: ICD-10-CM

## 2020-03-13 DIAGNOSIS — Z98.890 STATUS POST FOOT SURGERY: Primary | ICD-10-CM

## 2020-03-13 DIAGNOSIS — M20.5X2 ACQUIRED HALLUX LIMITUS OF BOTH FEET: ICD-10-CM

## 2020-03-13 PROCEDURE — 99213 OFFICE O/P EST LOW 20 MIN: CPT | Performed by: PODIATRIST

## 2020-03-13 RX ORDER — BIMATOPROST 3 UG/ML
SOLUTION TOPICAL
COMMUNITY
Start: 2020-02-17

## 2020-03-16 ENCOUNTER — OFFICE VISIT (OUTPATIENT)
Dept: PHYSICAL THERAPY | Facility: HOSPITAL | Age: 50
End: 2020-03-16
Attending: PODIATRIST
Payer: COMMERCIAL

## 2020-03-16 DIAGNOSIS — M77.50 CAPSULITIS OF METATARSOPHALANGEAL (MTP) JOINT: ICD-10-CM

## 2020-03-16 DIAGNOSIS — Z98.890 STATUS POST FOOT SURGERY: ICD-10-CM

## 2020-03-16 PROCEDURE — 97140 MANUAL THERAPY 1/> REGIONS: CPT

## 2020-03-16 PROCEDURE — 97110 THERAPEUTIC EXERCISES: CPT

## 2020-03-16 NOTE — PROGRESS NOTES
Dx: s/p foot surgery, capsulitis of L 1st MTP joint             Authorized # of Visits:  8 per POC         Next MD visit: none scheduled  Fall Risk: standard         Precautions: n/a             Subjective: Patient reports that she continues to have sorene glide, 10 min 1st MTP extension stretch against wall, 2x30 sec 1st MTP extension stretch against wall, 2x30 sec      1st MTP PROM, 15 min 1st MTP PROM, 15 min 1st MTP PROM, 15 min 1st MTP PROM, 15 min      Towel curl, 2 min Towel curl, 2 min 1st MTP extens

## 2020-03-16 NOTE — PROGRESS NOTES
Simona Lo is a 52year old female. Patient presents with:  Post-Op: LOV 2/19/20. pt is back to work, just not wearing heels. pt is still wearing the compression anklet, d/t swelling. pt is in PT and working out, just not much yoga yet.  some giselle Not on file      Highest education level: Not on file    Tobacco Use      Smoking status: Never Smoker      Smokeless tobacco: Never Used    Substance and Sexual Activity      Alcohol use: Yes        Comment: 4x/month, 1-2 drinks Cage done 09/12/2018

## 2020-03-18 ENCOUNTER — OFFICE VISIT (OUTPATIENT)
Dept: PHYSICAL THERAPY | Facility: HOSPITAL | Age: 50
End: 2020-03-18
Attending: PODIATRIST
Payer: COMMERCIAL

## 2020-03-18 DIAGNOSIS — M77.50 CAPSULITIS OF METATARSOPHALANGEAL (MTP) JOINT: ICD-10-CM

## 2020-03-18 DIAGNOSIS — Z98.890 STATUS POST FOOT SURGERY: ICD-10-CM

## 2020-03-18 PROCEDURE — 97140 MANUAL THERAPY 1/> REGIONS: CPT

## 2020-03-18 NOTE — PROGRESS NOTES
Dx: s/p foot surgery, capsulitis of L 1st MTP joint             Authorized # of Visits:  8 per POC         Next MD visit: none scheduled  Fall Risk: standard         Precautions: n/a             Subjective: Patient reports that she felt good after previous stretch against wall, 2x30 sec 1st MTP extension stretch against wall, 2x30 sec 1st MTP extension stretch against wall, 2x30 sec     1st MTP PROM, 15 min 1st MTP PROM, 15 min 1st MTP PROM, 15 min 1st MTP PROM, 15 min 1st MTP PROM, 15 min     Towel curl, 2

## 2020-03-23 ENCOUNTER — APPOINTMENT (OUTPATIENT)
Dept: PHYSICAL THERAPY | Facility: HOSPITAL | Age: 50
End: 2020-03-23
Attending: PODIATRIST
Payer: COMMERCIAL

## 2020-03-25 ENCOUNTER — APPOINTMENT (OUTPATIENT)
Dept: PHYSICAL THERAPY | Facility: HOSPITAL | Age: 50
End: 2020-03-25
Attending: PODIATRIST
Payer: COMMERCIAL

## 2020-03-25 ENCOUNTER — TELEPHONE (OUTPATIENT)
Dept: PHYSICAL THERAPY | Facility: HOSPITAL | Age: 50
End: 2020-03-25

## 2020-05-13 ENCOUNTER — VIRTUAL PHONE E/M (OUTPATIENT)
Dept: PODIATRY CLINIC | Facility: CLINIC | Age: 50
End: 2020-05-13
Payer: COMMERCIAL

## 2020-05-13 DIAGNOSIS — M77.50 CAPSULITIS OF METATARSOPHALANGEAL (MTP) JOINT: ICD-10-CM

## 2020-05-13 DIAGNOSIS — Z98.890 STATUS POST FOOT SURGERY: Primary | ICD-10-CM

## 2020-05-13 PROCEDURE — 99212 OFFICE O/P EST SF 10 MIN: CPT | Performed by: PODIATRIST

## 2020-05-13 NOTE — PROGRESS NOTES
Virtual Telephone Check-In    Luz Fernandez verbally consents to a Virtual/Telephone Check-In visit on 05/13/20.     Patient understands and accepts financial responsibility for any deductible, co-insurance and/or co-pays associated with this servic

## 2020-06-17 ENCOUNTER — HOSPITAL ENCOUNTER (OUTPATIENT)
Dept: MAMMOGRAPHY | Age: 50
Discharge: HOME | End: 2020-06-17
Attending: NURSE PRACTITIONER
Payer: COMMERCIAL

## 2020-06-17 DIAGNOSIS — Z12.31 ENCOUNTER FOR SCREENING MAMMOGRAM FOR BREAST CANCER: ICD-10-CM

## 2020-06-17 PROCEDURE — 77067 SCR MAMMO BI INCL CAD: CPT | Performed by: NURSE PRACTITIONER

## 2020-06-17 PROCEDURE — 77063 BREAST TOMOSYNTHESIS BI: CPT | Performed by: NURSE PRACTITIONER

## 2020-06-22 ENCOUNTER — HOSPITAL ENCOUNTER (OUTPATIENT)
Dept: MAMMOGRAPHY | Facility: HOSPITAL | Age: 50
Discharge: HOME | End: 2020-06-22
Attending: NURSE PRACTITIONER
Payer: COMMERCIAL

## 2020-06-22 DIAGNOSIS — R92.2 INCONCLUSIVE MAMMOGRAM: ICD-10-CM

## 2020-06-22 PROCEDURE — 77065 DX MAMMO INCL CAD UNI: CPT | Performed by: NURSE PRACTITIONER

## 2020-06-22 PROCEDURE — 77061 BREAST TOMOSYNTHESIS UNI: CPT | Performed by: NURSE PRACTITIONER

## 2020-06-22 PROCEDURE — 76642 ULTRASOUND BREAST LIMITED: CPT | Performed by: NURSE PRACTITIONER

## 2020-09-14 ENCOUNTER — TELEPHONE (OUTPATIENT)
Dept: INTERNAL MEDICINE CLINIC | Facility: CLINIC | Age: 50
End: 2020-09-14

## 2020-09-14 NOTE — TELEPHONE ENCOUNTER
Received completed paperwork for surgery with Dr Sesay Screen for 10/14/2020, 10/28/2020  Patient  have Cataract surgery    Future Appointments   Date Time Provider Ramsey Martin   9/30/2020  9:00 AM Babita Caldwell, [facility].

## 2020-09-30 ENCOUNTER — OFFICE VISIT (OUTPATIENT)
Dept: INTERNAL MEDICINE CLINIC | Facility: CLINIC | Age: 50
End: 2020-09-30
Payer: COMMERCIAL

## 2020-09-30 VITALS
BODY MASS INDEX: 23.39 KG/M2 | HEART RATE: 68 BPM | TEMPERATURE: 98 F | DIASTOLIC BLOOD PRESSURE: 64 MMHG | SYSTOLIC BLOOD PRESSURE: 102 MMHG | WEIGHT: 132 LBS | HEIGHT: 63 IN

## 2020-09-30 DIAGNOSIS — Z87.19 HISTORY OF ULCERATIVE COLITIS: ICD-10-CM

## 2020-09-30 DIAGNOSIS — H25.9 AGE-RELATED CATARACT OF BOTH EYES, UNSPECIFIED AGE-RELATED CATARACT TYPE: Primary | ICD-10-CM

## 2020-09-30 PROBLEM — S76.319A HAMSTRING STRAIN, UNSPECIFIED LATERALITY, INITIAL ENCOUNTER: Status: RESOLVED | Noted: 2017-12-09 | Resolved: 2020-09-30

## 2020-09-30 PROBLEM — M72.2 PLANTAR FASCIITIS OF LEFT FOOT: Status: RESOLVED | Noted: 2017-12-09 | Resolved: 2020-09-30

## 2020-09-30 PROCEDURE — 99214 OFFICE O/P EST MOD 30 MIN: CPT | Performed by: NURSE PRACTITIONER

## 2020-09-30 PROCEDURE — 3008F BODY MASS INDEX DOCD: CPT | Performed by: NURSE PRACTITIONER

## 2020-09-30 PROCEDURE — 3078F DIAST BP <80 MM HG: CPT | Performed by: NURSE PRACTITIONER

## 2020-09-30 PROCEDURE — 93000 ELECTROCARDIOGRAM COMPLETE: CPT | Performed by: NURSE PRACTITIONER

## 2020-09-30 PROCEDURE — 3074F SYST BP LT 130 MM HG: CPT | Performed by: NURSE PRACTITIONER

## 2020-09-30 NOTE — PROGRESS NOTES
[facility]  PRE OP RISK ASSESSMENT    REASON FOR CONSULT: Pre-op risk assessment for surgical procedure cataract extraction with implant planned for October 14 and 21, 2020 with Dr Garrison Pollack .     REQUESTING PHYSICIAN: Dr Duran Chance 10/19/2011  HIVES  NAPROXEN                          10/19/2011    OTHER                             12/12/2014      Review Complete  09/30/2020      SOCIAL HISTORY: Social History    Socioeconomic History      Marital status:       Spouse name: Not Concern: Not Asked        Stress Concern: Not Asked        Weight Concern: Not Asked        Special Diet: Not Asked        Back Care: Not Asked        Exercise: Yes          daily        Bike Helmet: Not Asked        Seat Belt: Yes        Self-Exams: Not A for this Visit:  Requested Prescriptions      No prescriptions requested or ordered in this encounter       Imaging & Consults:  ELECTROCARDIOGRAM, COMPLETE    No follow-ups on file. There are no Patient Instructions on file for this visit.

## 2020-10-11 ENCOUNTER — APPOINTMENT (OUTPATIENT)
Dept: LAB | Facility: HOSPITAL | Age: 50
End: 2020-10-11
Attending: OPHTHALMOLOGY
Payer: COMMERCIAL

## 2020-10-11 DIAGNOSIS — Z01.818 PRE-PROCEDURAL EXAMINATION: ICD-10-CM

## 2020-10-18 ENCOUNTER — APPOINTMENT (OUTPATIENT)
Dept: LAB | Facility: HOSPITAL | Age: 50
End: 2020-10-18
Attending: OPHTHALMOLOGY
Payer: COMMERCIAL

## 2020-10-18 DIAGNOSIS — Z01.818 PRE-PROCEDURAL EXAMINATION: ICD-10-CM

## 2021-05-11 ENCOUNTER — TELEPHONE (OUTPATIENT)
Dept: INTERNAL MEDICINE CLINIC | Facility: CLINIC | Age: 51
End: 2021-05-11

## 2021-05-11 DIAGNOSIS — Z13.29 SCREENING FOR ENDOCRINE, METABOLIC AND IMMUNITY DISORDER: ICD-10-CM

## 2021-05-11 DIAGNOSIS — Z13.29 SCREENING FOR THYROID DISORDER: ICD-10-CM

## 2021-05-11 DIAGNOSIS — Z13.228 SCREENING FOR ENDOCRINE, METABOLIC AND IMMUNITY DISORDER: ICD-10-CM

## 2021-05-11 DIAGNOSIS — Z13.0 SCREENING FOR ENDOCRINE, METABOLIC AND IMMUNITY DISORDER: ICD-10-CM

## 2021-05-11 DIAGNOSIS — Z13.220 SCREENING FOR LIPID DISORDERS: ICD-10-CM

## 2021-05-11 DIAGNOSIS — Z13.0 SCREENING FOR DISORDER OF BLOOD AND BLOOD-FORMING ORGANS: ICD-10-CM

## 2021-05-11 DIAGNOSIS — Z00.00 ROUTINE GENERAL MEDICAL EXAMINATION AT A HEALTH CARE FACILITY: Primary | ICD-10-CM

## 2021-05-11 NOTE — TELEPHONE ENCOUNTER
Orders to [facility]   Pt aware to get labs done no sooner than 2 weeks prior to the appt. Pt aware to fast.  No call back required.     Future Appointments   Date Time Provider Ramsey Martin   5/18/2021  8:45 AM REFERENCE [id number] [address]   5/1

## 2021-05-18 ENCOUNTER — OFFICE VISIT (OUTPATIENT)
Dept: INTERNAL MEDICINE CLINIC | Facility: CLINIC | Age: 51
End: 2021-05-18
Payer: COMMERCIAL

## 2021-05-18 ENCOUNTER — LAB ENCOUNTER (OUTPATIENT)
Dept: LAB | Age: 51
End: 2021-05-18
Attending: NURSE PRACTITIONER
Payer: COMMERCIAL

## 2021-05-18 VITALS
BODY MASS INDEX: 22.86 KG/M2 | WEIGHT: 129 LBS | HEIGHT: 63 IN | HEART RATE: 64 BPM | OXYGEN SATURATION: 100 % | TEMPERATURE: 97 F | SYSTOLIC BLOOD PRESSURE: 112 MMHG | DIASTOLIC BLOOD PRESSURE: 64 MMHG

## 2021-05-18 DIAGNOSIS — Z13.29 SCREENING FOR ENDOCRINE, METABOLIC AND IMMUNITY DISORDER: ICD-10-CM

## 2021-05-18 DIAGNOSIS — Z13.0 SCREENING FOR ENDOCRINE, METABOLIC AND IMMUNITY DISORDER: ICD-10-CM

## 2021-05-18 DIAGNOSIS — Z13.29 SCREENING FOR THYROID DISORDER: ICD-10-CM

## 2021-05-18 DIAGNOSIS — Z87.19 HISTORY OF ULCERATIVE COLITIS: ICD-10-CM

## 2021-05-18 DIAGNOSIS — R42 VERTIGO: ICD-10-CM

## 2021-05-18 DIAGNOSIS — Z13.0 SCREENING FOR DISORDER OF BLOOD AND BLOOD-FORMING ORGANS: ICD-10-CM

## 2021-05-18 DIAGNOSIS — Z13.228 SCREENING FOR ENDOCRINE, METABOLIC AND IMMUNITY DISORDER: ICD-10-CM

## 2021-05-18 DIAGNOSIS — Z00.00 ROUTINE GENERAL MEDICAL EXAMINATION AT A HEALTH CARE FACILITY: Primary | ICD-10-CM

## 2021-05-18 DIAGNOSIS — Z13.220 SCREENING FOR LIPID DISORDERS: ICD-10-CM

## 2021-05-18 DIAGNOSIS — Z00.00 ROUTINE GENERAL MEDICAL EXAMINATION AT A HEALTH CARE FACILITY: ICD-10-CM

## 2021-05-18 PROCEDURE — 80061 LIPID PANEL: CPT | Performed by: NURSE PRACTITIONER

## 2021-05-18 PROCEDURE — 3008F BODY MASS INDEX DOCD: CPT | Performed by: NURSE PRACTITIONER

## 2021-05-18 PROCEDURE — 99396 PREV VISIT EST AGE 40-64: CPT | Performed by: NURSE PRACTITIONER

## 2021-05-18 PROCEDURE — 3078F DIAST BP <80 MM HG: CPT | Performed by: NURSE PRACTITIONER

## 2021-05-18 PROCEDURE — 3074F SYST BP LT 130 MM HG: CPT | Performed by: NURSE PRACTITIONER

## 2021-05-18 PROCEDURE — 80050 GENERAL HEALTH PANEL: CPT | Performed by: NURSE PRACTITIONER

## 2021-05-18 RX ORDER — MECLIZINE HYDROCHLORIDE 25 MG/1
25 TABLET ORAL NIGHTLY PRN
Qty: 60 TABLET | Refills: 0 | Status: SHIPPED | OUTPATIENT
Start: 2021-05-18

## 2021-05-18 RX ORDER — FLUOCINOLONE ACETONIDE, HYDROQUINONE, AND TRETINOIN .1; 40; .5 MG/G; MG/G; MG/G
1 CREAM TOPICAL NIGHTLY
COMMUNITY
Start: 2020-12-17

## 2021-05-18 NOTE — PROGRESS NOTES
Cesia Sneed is a 48year old female who presents for a complete physical exam:       Patient complains of:    intermittenet vertigo flare. Last weekend. Did exercises which induced nausea  Resolved after a few days.   Discussed meclizine otc vs Laterality Date   • COLONOSCOPY  6/1/2003, 09/18/2009    (Fiberoptic)   • COLONOSCOPY     • FOOT SURGERY Left 01/2020   • HERNIA SURGERY  10/9/2015   • SIGMOIDOSCOPY,DIAGNOSTIC  10/11/2010    Ulcerative colitis, Dr. Elizabeth Rodriguez   • TONSILLECTOMY  1/1 itching  MUSCULOSKELETAL: denies back pain  NEURO: denies headaches  As above   PSYCH: no c/o      EXAM:   /64 (BP Location: Left arm, Patient Position: Sitting, Cuff Size: adult)   Pulse 64   Temp 97.3 °F (36.3 °C) (Temporal)   Ht 5' 3\" (1.6 m)   W

## 2021-06-04 PROCEDURE — 87624 HPV HI-RISK TYP POOLED RSLT: CPT | Performed by: NURSE PRACTITIONER

## 2021-06-04 PROCEDURE — 88175 CYTOPATH C/V AUTO FLUID REDO: CPT | Performed by: NURSE PRACTITIONER

## 2021-06-21 ENCOUNTER — HOSPITAL ENCOUNTER (OUTPATIENT)
Dept: MAMMOGRAPHY | Age: 51
Discharge: HOME | End: 2021-06-21
Attending: NURSE PRACTITIONER
Payer: COMMERCIAL

## 2021-06-21 DIAGNOSIS — Z01.419 WOMEN'S ANNUAL ROUTINE GYNECOLOGICAL EXAMINATION: ICD-10-CM

## 2021-06-21 DIAGNOSIS — Z12.31 SCREENING MAMMOGRAM, ENCOUNTER FOR: ICD-10-CM

## 2021-06-21 PROCEDURE — 77067 SCR MAMMO BI INCL CAD: CPT | Performed by: NURSE PRACTITIONER

## 2021-06-21 PROCEDURE — 77063 BREAST TOMOSYNTHESIS BI: CPT | Performed by: NURSE PRACTITIONER

## 2022-02-09 ENCOUNTER — TELEPHONE (OUTPATIENT)
Dept: INTERNAL MEDICINE CLINIC | Facility: CLINIC | Age: 52
End: 2022-02-09

## 2022-02-09 RX ORDER — CYCLOBENZAPRINE HCL 10 MG
5 TABLET ORAL NIGHTLY PRN
Qty: 20 TABLET | Refills: 0 | Status: SHIPPED | OUTPATIENT
Start: 2022-02-09 | End: 2022-03-01

## 2022-02-09 NOTE — TELEPHONE ENCOUNTER
Ordered flexeril to use hs only. Warn pt about sedation with this medication and advised pt about necessary precautions and activities to avoid.   Aleve 2 tab bid x 5-7 ays

## 2022-04-24 NOTE — TELEPHONE ENCOUNTER
Spoke with pt. Informed her flexeril has been sent. Advised pt to only take at HS. Warned pt about sedation. Informed pt she can also take Aleve 2 tab bid for 5-7 days. Pt said that she took 2 tabs last night and it did not seem to help much. Pt said that she will try SD recommendations and call back if it does not help. Pt said that she has been applying ice. She will start alternating heat/ice to see if that gives her relief. resolved

## 2022-07-27 ENCOUNTER — HOSPITAL ENCOUNTER (OUTPATIENT)
Dept: MAMMOGRAPHY | Age: 52
Discharge: HOME | End: 2022-07-27
Attending: NURSE PRACTITIONER
Payer: COMMERCIAL

## 2022-07-27 DIAGNOSIS — Z12.31 ENCOUNTER FOR SCREENING MAMMOGRAM FOR BREAST CANCER: ICD-10-CM

## 2022-07-27 PROCEDURE — 77067 SCR MAMMO BI INCL CAD: CPT | Performed by: NURSE PRACTITIONER

## 2022-07-27 PROCEDURE — 77063 BREAST TOMOSYNTHESIS BI: CPT | Performed by: NURSE PRACTITIONER

## 2022-08-10 ENCOUNTER — HOSPITAL ENCOUNTER (OUTPATIENT)
Dept: MAMMOGRAPHY | Facility: HOSPITAL | Age: 52
Discharge: HOME | End: 2022-08-10
Attending: NURSE PRACTITIONER
Payer: COMMERCIAL

## 2022-08-10 DIAGNOSIS — R92.8 ABNORMAL MAMMOGRAM: ICD-10-CM

## 2022-08-10 PROCEDURE — 77061 BREAST TOMOSYNTHESIS UNI: CPT | Performed by: NURSE PRACTITIONER

## 2022-08-10 PROCEDURE — 77065 DX MAMMO INCL CAD UNI: CPT | Performed by: NURSE PRACTITIONER

## 2022-08-10 NOTE — IMAGING NOTE
This Breast Care RN assisted Dr. Elyo Burleson with recommendation for a left breast 2 site stereotactic biopsy for calcifications. Procedure reviewed and all questions answered. Emotional and educational support given. On the day of the biopsy, pt instructed to take Tylenol 1000mg PO, eat a light meal & bring or wear a sports bra. Post biopsy care also reviewed with pt to include NO lifting more than 5lbs, no exercising or housework (limit upper body movement) for 24-48 hrs post biopsy. Patient denies blood thinners, bleeding disorders, liver disease, and chemo. Pt verbalized understanding. Our breast center schedulers will be calling to schedule an appt that is convenient for pt.

## 2022-08-16 ENCOUNTER — HOSPITAL ENCOUNTER (OUTPATIENT)
Dept: MAMMOGRAPHY | Facility: HOSPITAL | Age: 52
Discharge: HOME | End: 2022-08-16
Attending: OBSTETRICS & GYNECOLOGY
Payer: COMMERCIAL

## 2022-08-16 DIAGNOSIS — R92.1 BREAST CALCIFICATIONS: ICD-10-CM

## 2022-08-16 PROCEDURE — 19082 BX BREAST ADD LESION STRTCTC: CPT | Performed by: OBSTETRICS & GYNECOLOGY

## 2022-08-16 PROCEDURE — 19081 BX BREAST 1ST LESION STRTCTC: CPT | Performed by: OBSTETRICS & GYNECOLOGY

## 2022-08-16 PROCEDURE — 88305 TISSUE EXAM BY PATHOLOGIST: CPT | Performed by: OBSTETRICS & GYNECOLOGY

## 2022-08-17 ENCOUNTER — TELEPHONE (OUTPATIENT)
Dept: MAMMOGRAPHY | Facility: HOSPITAL | Age: 52
End: 2022-08-17

## 2022-08-17 NOTE — TELEPHONE ENCOUNTER
Telephoned Silvestre Feng and name,  verified with patient. Notified Silvestre Jung of left breast 1 site negative for malignancy and left breast 1 site negative for malignancy but positive for papilloma biopsy result. Concordance pending. Silvestre Feng reports biopsy site is healing well. Radiologist recommends surgical consultation. Dr Kim Art office is referring patient to Dr Mehdi Weems. Patient was provided with the contact information for Dr Mehdi Weems as well as other [facility] breast surgeons per patient's request. Patient instructed to call for a consultation appt. Pt verbalized understanding and had no further questions at this time.

## 2022-08-19 ENCOUNTER — TELEPHONE (OUTPATIENT)
Dept: MAMMOGRAPHY | Facility: HOSPITAL | Age: 52
End: 2022-08-19

## 2022-08-19 NOTE — TELEPHONE ENCOUNTER
Called and spoke to patient re: request for additional left breast mag views recommended by Dr Martinez Prasad after review of breast pathology results to determine if there is another area of calcifications. Patient verbalized understanding and will come in on 8-26-22 at [facility] notified and patient will be added to the schedule.

## 2022-08-24 ENCOUNTER — HOSPITAL ENCOUNTER (OUTPATIENT)
Dept: MAMMOGRAPHY | Facility: HOSPITAL | Age: 52
Discharge: HOME | End: 2022-08-24
Attending: OBSTETRICS & GYNECOLOGY
Payer: COMMERCIAL

## 2022-08-24 DIAGNOSIS — R92.1 BREAST CALCIFICATIONS: ICD-10-CM

## 2022-08-24 PROCEDURE — 77065 DX MAMMO INCL CAD UNI: CPT | Performed by: OBSTETRICS & GYNECOLOGY

## 2022-09-06 ENCOUNTER — HOSPITAL ENCOUNTER (OUTPATIENT)
Dept: MAMMOGRAPHY | Facility: HOSPITAL | Age: 52
Discharge: HOME | End: 2022-09-06
Attending: OBSTETRICS & GYNECOLOGY
Payer: COMMERCIAL

## 2022-09-06 DIAGNOSIS — R92.1 BREAST CALCIFICATIONS: ICD-10-CM

## 2022-09-06 PROCEDURE — 88341 IMHCHEM/IMCYTCHM EA ADD ANTB: CPT | Performed by: OBSTETRICS & GYNECOLOGY

## 2022-09-06 PROCEDURE — 88342 IMHCHEM/IMCYTCHM 1ST ANTB: CPT | Performed by: OBSTETRICS & GYNECOLOGY

## 2022-09-06 PROCEDURE — 19081 BX BREAST 1ST LESION STRTCTC: CPT | Performed by: OBSTETRICS & GYNECOLOGY

## 2022-09-06 PROCEDURE — 88305 TISSUE EXAM BY PATHOLOGIST: CPT | Performed by: OBSTETRICS & GYNECOLOGY

## 2022-09-08 NOTE — IMAGING NOTE
80: Spoke with Chasidy Braun post stereotactic left breast biopsy. Introduced myself as breast care coordinator. Name and date of birth verified. Reinforced post biopsy care and instruction. Ms. Jaziel Lea denies any issues with biopsy site- bleeding, drainage, redness, tenderness. Pathology results and recommendations discussed as follows:   Final Diagnosis:   Left breast calcifications 1:00:  -Benign fibrocystic changes with focal microcalcifications.  -No atypia or malignancy. Electronically signed by Patsy Cyr MD on 9/8/2022 at 26   IMPRESSION:  Pathology is reported as benign, with findings concordant with the imaging assessment. Surgical consultation is recommended for management of the biopsy done at the 7 o'clock position in the left breast, marked with a Q clip;  pathology findings at this site indicated a 1.5 mm focus of a papilloma (an incidental finding, not associated   with the targeted calcifications that were biopsied and shown to be associated with benign fibrocystic changes). Dictated by (CST): Michelle Zaragoza MD on 9/08/2022 at 2:28 PM       Finalized by (CST): Michelle Zaragoza MD on 9/08/2022 at 2:34 PM            Lulu Silva verbalized understanding and agreement to the above. Ms. Jaziel Lea shared that she has an appointment with Dr. Yumiko Harman Wednesday, September 14. Ms. Jaziel Lea instructed to perform breast self-exams and notify physician of any changes/concerns. Ms. Jaziel Lea verbalized agreement.

## 2022-09-14 ENCOUNTER — OFFICE VISIT (OUTPATIENT)
Dept: SURGERY | Facility: CLINIC | Age: 52
End: 2022-09-14
Payer: COMMERCIAL

## 2022-09-14 VITALS
RESPIRATION RATE: 16 BRPM | DIASTOLIC BLOOD PRESSURE: 93 MMHG | HEART RATE: 68 BPM | SYSTOLIC BLOOD PRESSURE: 143 MMHG | BODY MASS INDEX: 22.54 KG/M2 | OXYGEN SATURATION: 99 % | TEMPERATURE: 98 F | WEIGHT: 127.19 LBS | HEIGHT: 63 IN

## 2022-09-14 DIAGNOSIS — R92.1 BREAST CALCIFICATION, LEFT: Primary | ICD-10-CM

## 2022-09-14 DIAGNOSIS — D36.9 PAPILLOMA: ICD-10-CM

## 2022-09-14 PROCEDURE — 99244 OFF/OP CNSLTJ NEW/EST MOD 40: CPT | Performed by: SURGERY

## 2022-09-14 PROCEDURE — 3080F DIAST BP >= 90 MM HG: CPT | Performed by: SURGERY

## 2022-09-14 PROCEDURE — 3008F BODY MASS INDEX DOCD: CPT | Performed by: SURGERY

## 2022-09-14 PROCEDURE — 3077F SYST BP >= 140 MM HG: CPT | Performed by: SURGERY

## 2022-09-30 ENCOUNTER — TELEPHONE (OUTPATIENT)
Dept: INTERNAL MEDICINE CLINIC | Facility: CLINIC | Age: 52
End: 2022-09-30

## 2022-09-30 DIAGNOSIS — Z13.0 SCREENING FOR DISORDER OF BLOOD AND BLOOD-FORMING ORGANS: ICD-10-CM

## 2022-09-30 DIAGNOSIS — Z13.228 SCREENING FOR METABOLIC DISORDER: ICD-10-CM

## 2022-09-30 DIAGNOSIS — Z00.00 ROUTINE GENERAL MEDICAL EXAMINATION AT A HEALTH CARE FACILITY: Primary | ICD-10-CM

## 2022-09-30 DIAGNOSIS — Z13.29 SCREENING FOR THYROID DISORDER: ICD-10-CM

## 2022-09-30 DIAGNOSIS — Z13.220 SCREENING FOR LIPID DISORDERS: ICD-10-CM

## 2022-09-30 NOTE — TELEPHONE ENCOUNTER
Future Appointments   Date Time Provider Ramsey Martin   10/21/2022  8:30 AM REFERENCE [id number]   10/21/2022  9:40 AM Tricia Roman,  [facility] [address] [facility]     Patient is scheduled for Annual Physical.  Please place orders with THE [facility]. Patient aware to fast.  No call back required. Patient informed that labs need to be completed no sooner than 2 weeks prior to the appointment.

## 2022-10-21 ENCOUNTER — OFFICE VISIT (OUTPATIENT)
Dept: INTERNAL MEDICINE CLINIC | Facility: CLINIC | Age: 52
End: 2022-10-21
Payer: COMMERCIAL

## 2022-10-21 ENCOUNTER — LAB ENCOUNTER (OUTPATIENT)
Dept: LAB | Age: 52
End: 2022-10-21
Attending: NURSE PRACTITIONER
Payer: COMMERCIAL

## 2022-10-21 ENCOUNTER — LAB ENCOUNTER (OUTPATIENT)
Dept: LAB | Age: 52
End: 2022-10-21
Attending: INTERNAL MEDICINE
Payer: COMMERCIAL

## 2022-10-21 VITALS
SYSTOLIC BLOOD PRESSURE: 118 MMHG | HEART RATE: 70 BPM | DIASTOLIC BLOOD PRESSURE: 62 MMHG | RESPIRATION RATE: 18 BRPM | BODY MASS INDEX: 22.11 KG/M2 | OXYGEN SATURATION: 100 % | HEIGHT: 63 IN | WEIGHT: 124.81 LBS

## 2022-10-21 DIAGNOSIS — Z13.29 SCREENING FOR THYROID DISORDER: ICD-10-CM

## 2022-10-21 DIAGNOSIS — Z00.00 ROUTINE GENERAL MEDICAL EXAMINATION AT A HEALTH CARE FACILITY: Primary | ICD-10-CM

## 2022-10-21 DIAGNOSIS — Z87.19 HISTORY OF ULCERATIVE COLITIS: ICD-10-CM

## 2022-10-21 DIAGNOSIS — Z13.0 SCREENING FOR DISORDER OF BLOOD AND BLOOD-FORMING ORGANS: ICD-10-CM

## 2022-10-21 DIAGNOSIS — Z13.220 SCREENING FOR LIPID DISORDERS: ICD-10-CM

## 2022-10-21 DIAGNOSIS — Z00.00 ROUTINE GENERAL MEDICAL EXAMINATION AT A HEALTH CARE FACILITY: ICD-10-CM

## 2022-10-21 DIAGNOSIS — D24.2 INTRADUCTAL PAPILLOMA OF BREAST, LEFT: ICD-10-CM

## 2022-10-21 DIAGNOSIS — Z13.228 SCREENING FOR METABOLIC DISORDER: ICD-10-CM

## 2022-10-21 DIAGNOSIS — N95.1 PERIMENOPAUSE: ICD-10-CM

## 2022-10-21 DIAGNOSIS — R42 VERTIGO: ICD-10-CM

## 2022-10-21 DIAGNOSIS — H11.433 CONJUNCTIVAL HYPEREMIA OF BOTH EYES: ICD-10-CM

## 2022-10-21 LAB
ALBUMIN SERPL-MCNC: 3.9 G/DL (ref 3.4–5)
ALBUMIN/GLOB SERPL: 1.6 {RATIO} (ref 1–2)
ALP LIVER SERPL-CCNC: 74 U/L
ALT SERPL-CCNC: 24 U/L
ANION GAP SERPL CALC-SCNC: 7 MMOL/L (ref 0–18)
AST SERPL-CCNC: 18 U/L (ref 15–37)
BASOPHILS # BLD AUTO: 0.05 X10(3) UL (ref 0–0.2)
BASOPHILS NFR BLD AUTO: 0.7 %
BILIRUB SERPL-MCNC: 0.4 MG/DL (ref 0.1–2)
BUN BLD-MCNC: 9 MG/DL (ref 7–18)
BUN/CREAT SERPL: 15.3 (ref 10–20)
CALCIUM BLD-MCNC: 8.6 MG/DL (ref 8.5–10.1)
CHLORIDE SERPL-SCNC: 106 MMOL/L (ref 98–112)
CHOLEST SERPL-MCNC: 176 MG/DL (ref ?–200)
CO2 SERPL-SCNC: 25 MMOL/L (ref 21–32)
CREAT BLD-MCNC: 0.59 MG/DL
DEPRECATED RDW RBC AUTO: 43.8 FL (ref 35.1–46.3)
EOSINOPHIL # BLD AUTO: 0.19 X10(3) UL (ref 0–0.7)
EOSINOPHIL NFR BLD AUTO: 2.7 %
ERYTHROCYTE [DISTWIDTH] IN BLOOD BY AUTOMATED COUNT: 12.2 % (ref 11–15)
FASTING PATIENT LIPID ANSWER: YES
FASTING STATUS PATIENT QL REPORTED: YES
FSH SERPL-ACNC: 11.8 MIU/ML
GFR SERPLBLD BASED ON 1.73 SQ M-ARVRAT: 108 ML/MIN/1.73M2 (ref 60–?)
GLOBULIN PLAS-MCNC: 2.5 G/DL (ref 2.8–4.4)
GLUCOSE BLD-MCNC: 85 MG/DL (ref 70–99)
HCT VFR BLD AUTO: 41.1 %
HDLC SERPL-MCNC: 85 MG/DL (ref 40–59)
HGB BLD-MCNC: 13.6 G/DL
IMM GRANULOCYTES # BLD AUTO: 0.02 X10(3) UL (ref 0–1)
IMM GRANULOCYTES NFR BLD: 0.3 %
LDLC SERPL CALC-MCNC: 82 MG/DL (ref ?–100)
LYMPHOCYTES # BLD AUTO: 1.22 X10(3) UL (ref 1–4)
LYMPHOCYTES NFR BLD AUTO: 17.3 %
MCH RBC QN AUTO: 32.2 PG (ref 26–34)
MCHC RBC AUTO-ENTMCNC: 33.1 G/DL (ref 31–37)
MCV RBC AUTO: 97.4 FL
MONOCYTES # BLD AUTO: 0.68 X10(3) UL (ref 0.1–1)
MONOCYTES NFR BLD AUTO: 9.6 %
NEUTROPHILS # BLD AUTO: 4.89 X10 (3) UL (ref 1.5–7.7)
NEUTROPHILS # BLD AUTO: 4.89 X10(3) UL (ref 1.5–7.7)
NEUTROPHILS NFR BLD AUTO: 69.4 %
NONHDLC SERPL-MCNC: 91 MG/DL (ref ?–130)
OSMOLALITY SERPL CALC.SUM OF ELEC: 284 MOSM/KG (ref 275–295)
PLATELET # BLD AUTO: 227 10(3)UL (ref 150–450)
POTASSIUM SERPL-SCNC: 4.1 MMOL/L (ref 3.5–5.1)
PROT SERPL-MCNC: 6.4 G/DL (ref 6.4–8.2)
RBC # BLD AUTO: 4.22 X10(6)UL
SODIUM SERPL-SCNC: 138 MMOL/L (ref 136–145)
TRIGL SERPL-MCNC: 45 MG/DL (ref 30–149)
TSI SER-ACNC: 1.88 MIU/ML (ref 0.36–3.74)
VLDLC SERPL CALC-MCNC: 7 MG/DL (ref 0–30)
WBC # BLD AUTO: 7.1 X10(3) UL (ref 4–11)

## 2022-10-21 PROCEDURE — 80061 LIPID PANEL: CPT | Performed by: INTERNAL MEDICINE

## 2022-10-21 PROCEDURE — 3008F BODY MASS INDEX DOCD: CPT | Performed by: INTERNAL MEDICINE

## 2022-10-21 PROCEDURE — 99396 PREV VISIT EST AGE 40-64: CPT | Performed by: INTERNAL MEDICINE

## 2022-10-21 PROCEDURE — 80050 GENERAL HEALTH PANEL: CPT | Performed by: INTERNAL MEDICINE

## 2022-10-21 PROCEDURE — 3074F SYST BP LT 130 MM HG: CPT | Performed by: INTERNAL MEDICINE

## 2022-10-21 PROCEDURE — 3078F DIAST BP <80 MM HG: CPT | Performed by: INTERNAL MEDICINE

## 2022-10-21 NOTE — PROGRESS NOTES
CPE - Completed TODAY  PAP - UTD Until 6/4/2026  MAMMOGRAM - Due Ordered 9/14/2022  COLONOSCOPY - UTD Until 9/24/2028      HYPERTENSION PT

## 2022-11-10 ENCOUNTER — OFFICE VISIT (OUTPATIENT)
Dept: INTERNAL MEDICINE CLINIC | Facility: CLINIC | Age: 52
End: 2022-11-10
Payer: COMMERCIAL

## 2022-11-10 VITALS
BODY MASS INDEX: 22.02 KG/M2 | TEMPERATURE: 98 F | WEIGHT: 124.25 LBS | HEIGHT: 63 IN | RESPIRATION RATE: 16 BRPM | OXYGEN SATURATION: 95 % | HEART RATE: 64 BPM | DIASTOLIC BLOOD PRESSURE: 70 MMHG | SYSTOLIC BLOOD PRESSURE: 122 MMHG

## 2022-11-10 DIAGNOSIS — J02.9 PHARYNGITIS, UNSPECIFIED ETIOLOGY: Primary | ICD-10-CM

## 2022-11-10 DIAGNOSIS — J04.0 LARYNGITIS: ICD-10-CM

## 2022-11-10 LAB
CONTROL LINE PRESENT WITH A CLEAR BACKGROUND (YES/NO): YES YES/NO
KIT LOT #: 4010 NUMERIC
STREP GRP A CUL-SCR: NEGATIVE

## 2022-11-10 RX ORDER — PREDNISONE 20 MG/1
40 TABLET ORAL DAILY
Qty: 10 TABLET | Refills: 0 | Status: SHIPPED | OUTPATIENT
Start: 2022-11-10 | End: 2022-11-15

## 2022-11-11 LAB — SARS-COV-2 RNA RESP QL NAA+PROBE: NOT DETECTED

## 2023-01-17 ENCOUNTER — TELEPHONE (OUTPATIENT)
Dept: INTERNAL MEDICINE CLINIC | Facility: CLINIC | Age: 53
End: 2023-01-17

## 2023-02-14 ENCOUNTER — HOSPITAL ENCOUNTER (OUTPATIENT)
Dept: MAMMOGRAPHY | Facility: HOSPITAL | Age: 53
Discharge: HOME | End: 2023-02-14
Attending: SURGERY
Payer: COMMERCIAL

## 2023-02-14 DIAGNOSIS — R92.1 BREAST CALCIFICATION, LEFT: ICD-10-CM

## 2023-02-14 PROCEDURE — 77065 DX MAMMO INCL CAD UNI: CPT | Performed by: SURGERY

## 2023-02-14 PROCEDURE — 77061 BREAST TOMOSYNTHESIS UNI: CPT | Performed by: SURGERY

## 2023-03-31 ENCOUNTER — TELEPHONE (OUTPATIENT)
Dept: INTERNAL MEDICINE CLINIC | Facility: CLINIC | Age: 53
End: 2023-03-31

## 2023-04-04 ENCOUNTER — TELEPHONE (OUTPATIENT)
Dept: INTERNAL MEDICINE CLINIC | Facility: CLINIC | Age: 53
End: 2023-04-04

## 2023-04-04 ENCOUNTER — PATIENT MESSAGE (OUTPATIENT)
Dept: INTERNAL MEDICINE CLINIC | Facility: CLINIC | Age: 53
End: 2023-04-04

## 2023-04-04 ENCOUNTER — LAB ENCOUNTER (OUTPATIENT)
Dept: LAB | Age: 53
End: 2023-04-04
Attending: NURSE PRACTITIONER
Payer: COMMERCIAL

## 2023-04-04 ENCOUNTER — OFFICE VISIT (OUTPATIENT)
Dept: INTERNAL MEDICINE CLINIC | Facility: CLINIC | Age: 53
End: 2023-04-04
Payer: COMMERCIAL

## 2023-04-04 ENCOUNTER — HOSPITAL ENCOUNTER (OUTPATIENT)
Dept: MRI IMAGING | Facility: HOSPITAL | Age: 53
Discharge: HOME | End: 2023-04-04
Attending: NURSE PRACTITIONER
Payer: COMMERCIAL

## 2023-04-04 VITALS
WEIGHT: 124.81 LBS | TEMPERATURE: 98 F | HEIGHT: 63 IN | SYSTOLIC BLOOD PRESSURE: 120 MMHG | RESPIRATION RATE: 16 BRPM | OXYGEN SATURATION: 97 % | BODY MASS INDEX: 22.11 KG/M2 | HEART RATE: 75 BPM | DIASTOLIC BLOOD PRESSURE: 74 MMHG

## 2023-04-04 DIAGNOSIS — Z78.9 RECENT TRAVEL ON AIRCRAFT: ICD-10-CM

## 2023-04-04 DIAGNOSIS — R42 DIZZINESS: Primary | ICD-10-CM

## 2023-04-04 DIAGNOSIS — R42 DIZZINESS: ICD-10-CM

## 2023-04-04 DIAGNOSIS — R20.2 PARESTHESIAS: ICD-10-CM

## 2023-04-04 DIAGNOSIS — R31.9 HEMATURIA, UNSPECIFIED TYPE: ICD-10-CM

## 2023-04-04 LAB
BILIRUB UR QL STRIP.AUTO: NEGATIVE
COLOR UR AUTO: YELLOW
D DIMER PPP FEU-MCNC: <0.27 UG/ML FEU (ref ?–0.52)
GLUCOSE UR STRIP.AUTO-MCNC: NEGATIVE MG/DL
KETONES UR STRIP.AUTO-MCNC: NEGATIVE MG/DL
LEUKOCYTE ESTERASE UR QL STRIP.AUTO: NEGATIVE
NITRITE UR QL STRIP.AUTO: NEGATIVE
PH UR STRIP.AUTO: 7 [PH] (ref 5–8)
PROT UR STRIP.AUTO-MCNC: NEGATIVE MG/DL
SP GR UR STRIP.AUTO: 1.01 (ref 1–1.03)
UROBILINOGEN UR STRIP.AUTO-MCNC: <2 MG/DL

## 2023-04-04 PROCEDURE — 3074F SYST BP LT 130 MM HG: CPT | Performed by: NURSE PRACTITIONER

## 2023-04-04 PROCEDURE — 99214 OFFICE O/P EST MOD 30 MIN: CPT | Performed by: NURSE PRACTITIONER

## 2023-04-04 PROCEDURE — 3008F BODY MASS INDEX DOCD: CPT | Performed by: NURSE PRACTITIONER

## 2023-04-04 PROCEDURE — 81001 URINALYSIS AUTO W/SCOPE: CPT | Performed by: NURSE PRACTITIONER

## 2023-04-04 PROCEDURE — 36415 COLL VENOUS BLD VENIPUNCTURE: CPT

## 2023-04-04 PROCEDURE — 85379 FIBRIN DEGRADATION QUANT: CPT

## 2023-04-04 PROCEDURE — 70551 MRI BRAIN STEM W/O DYE: CPT | Performed by: NURSE PRACTITIONER

## 2023-04-04 PROCEDURE — 3078F DIAST BP <80 MM HG: CPT | Performed by: NURSE PRACTITIONER

## 2023-04-04 NOTE — TELEPHONE ENCOUNTER
D dimer STAT results called from [address] [place] [address] at [address] [facility]=<0.27.
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
## 2023-08-02 ENCOUNTER — HOSPITAL ENCOUNTER (OUTPATIENT)
Dept: MAMMOGRAPHY | Age: 53
Discharge: HOME | End: 2023-08-02
Payer: COMMERCIAL

## 2023-08-02 ENCOUNTER — HOSPITAL ENCOUNTER (OUTPATIENT)
Dept: MAMMOGRAPHY | Facility: HOSPITAL | Age: 53
Discharge: HOME | End: 2023-08-02
Payer: COMMERCIAL

## 2023-08-02 DIAGNOSIS — R92.2 INCONCLUSIVE MAMMOGRAM: ICD-10-CM

## 2023-08-02 DIAGNOSIS — Z12.31 ENCOUNTER FOR SCREENING MAMMOGRAM FOR BREAST CANCER: ICD-10-CM

## 2023-08-02 PROCEDURE — 76642 ULTRASOUND BREAST LIMITED: CPT

## 2023-08-02 PROCEDURE — 77067 SCR MAMMO BI INCL CAD: CPT

## 2023-08-02 PROCEDURE — 77065 DX MAMMO INCL CAD UNI: CPT

## 2023-08-02 PROCEDURE — 77063 BREAST TOMOSYNTHESIS BI: CPT

## 2023-08-02 PROCEDURE — 77061 BREAST TOMOSYNTHESIS UNI: CPT

## 2023-10-27 PROBLEM — K51.20 ULCERATIVE PROCTITIS WITHOUT COMPLICATION (HCC): Status: ACTIVE | Noted: 2023-10-27

## 2023-10-27 PROBLEM — D12.5 BENIGN NEOPLASM OF SIGMOID COLON: Status: ACTIVE | Noted: 2023-10-27

## 2024-08-06 ENCOUNTER — TELEPHONE (OUTPATIENT)
Dept: INTERNAL MEDICINE CLINIC | Facility: CLINIC | Age: 54
End: 2024-08-06

## 2024-08-06 NOTE — TELEPHONE ENCOUNTER
We received med record request from [facility] for last 2 years of records-sent to scanning and verisima

## 2024-10-24 ENCOUNTER — TELEPHONE (OUTPATIENT)
Facility: CLINIC | Age: 54
End: 2024-10-24

## (undated) NOTE — MR AVS SNAPSHOT
[facility] [id number] [phone]               Thank you for choosing us for your health care visit with Jamia Ballesteros MD.  We are glad to serve you and happy to provide you with this summ Visit [facility] online at  [email or web address]

## (undated) NOTE — LETTER
2/19/2020          To Whom It May Concern: Idris Harper is currently under my medical care, and was in the office for an appointment today. If you require additional information please contact our office.         Sincerely,              Pauline Sommer